# Patient Record
Sex: FEMALE | Race: WHITE | NOT HISPANIC OR LATINO
[De-identification: names, ages, dates, MRNs, and addresses within clinical notes are randomized per-mention and may not be internally consistent; named-entity substitution may affect disease eponyms.]

---

## 2017-07-13 ENCOUNTER — APPOINTMENT (OUTPATIENT)
Dept: HOME HEALTH SERVICES | Facility: HOME HEALTH | Age: 82
End: 2017-07-13

## 2017-07-13 VITALS
DIASTOLIC BLOOD PRESSURE: 70 MMHG | HEIGHT: 64 IN | WEIGHT: 135 LBS | SYSTOLIC BLOOD PRESSURE: 110 MMHG | TEMPERATURE: 97.4 F | OXYGEN SATURATION: 92 % | BODY MASS INDEX: 23.05 KG/M2 | RESPIRATION RATE: 14 BRPM | HEART RATE: 58 BPM

## 2017-07-13 DIAGNOSIS — E78.5 HYPERLIPIDEMIA, UNSPECIFIED: ICD-10-CM

## 2017-07-13 DIAGNOSIS — E55.9 VITAMIN D DEFICIENCY, UNSPECIFIED: ICD-10-CM

## 2017-07-13 DIAGNOSIS — D64.9 ANEMIA, UNSPECIFIED: ICD-10-CM

## 2017-07-13 DIAGNOSIS — H40.9 UNSPECIFIED GLAUCOMA: ICD-10-CM

## 2017-07-24 ENCOUNTER — MEDICATION RENEWAL (OUTPATIENT)
Age: 82
End: 2017-07-24

## 2017-07-27 ENCOUNTER — MEDICATION RENEWAL (OUTPATIENT)
Age: 82
End: 2017-07-27

## 2017-07-27 RX ORDER — ALPRAZOLAM 0.25 MG/1
0.25 TABLET ORAL
Refills: 0 | Status: DISCONTINUED | COMMUNITY
Start: 2017-07-13 | End: 2017-07-27

## 2017-08-28 ENCOUNTER — MEDICATION RENEWAL (OUTPATIENT)
Age: 82
End: 2017-08-28

## 2017-09-05 ENCOUNTER — MEDICATION RENEWAL (OUTPATIENT)
Age: 82
End: 2017-09-05

## 2017-09-11 ENCOUNTER — MEDICATION RENEWAL (OUTPATIENT)
Age: 82
End: 2017-09-11

## 2017-09-15 ENCOUNTER — APPOINTMENT (OUTPATIENT)
Dept: HOME HEALTH SERVICES | Facility: HOME HEALTH | Age: 82
End: 2017-09-15
Payer: MEDICARE

## 2017-09-15 VITALS
SYSTOLIC BLOOD PRESSURE: 120 MMHG | DIASTOLIC BLOOD PRESSURE: 80 MMHG | HEART RATE: 87 BPM | RESPIRATION RATE: 12 BRPM | TEMPERATURE: 97.1 F | OXYGEN SATURATION: 97 %

## 2017-09-15 DIAGNOSIS — H61.21 IMPACTED CERUMEN, RIGHT EAR: ICD-10-CM

## 2017-09-15 DIAGNOSIS — I34.1 NONRHEUMATIC MITRAL (VALVE) PROLAPSE: ICD-10-CM

## 2017-09-15 DIAGNOSIS — M54.9 DORSALGIA, UNSPECIFIED: ICD-10-CM

## 2017-09-15 PROCEDURE — 99349 HOME/RES VST EST MOD MDM 40: CPT | Mod: 25

## 2017-09-15 PROCEDURE — 69210 REMOVE IMPACTED EAR WAX UNI: CPT

## 2017-09-26 ENCOUNTER — CLINICAL ADVICE (OUTPATIENT)
Age: 82
End: 2017-09-26

## 2017-09-26 ENCOUNTER — MEDICATION RENEWAL (OUTPATIENT)
Age: 82
End: 2017-09-26

## 2017-09-26 DIAGNOSIS — Z00.00 ENCOUNTER FOR GENERAL ADULT MEDICAL EXAMINATION W/OUT ABNORMAL FINDINGS: ICD-10-CM

## 2017-10-02 ENCOUNTER — MEDICATION RENEWAL (OUTPATIENT)
Age: 82
End: 2017-10-02

## 2017-10-11 ENCOUNTER — RX RENEWAL (OUTPATIENT)
Age: 82
End: 2017-10-11

## 2017-10-20 ENCOUNTER — APPOINTMENT (OUTPATIENT)
Dept: HOME HEALTH SERVICES | Facility: HOME HEALTH | Age: 82
End: 2017-10-20

## 2017-11-06 ENCOUNTER — RX RENEWAL (OUTPATIENT)
Age: 82
End: 2017-11-06

## 2017-11-06 ENCOUNTER — MEDICATION RENEWAL (OUTPATIENT)
Age: 82
End: 2017-11-06

## 2017-11-17 ENCOUNTER — APPOINTMENT (OUTPATIENT)
Dept: HOME HEALTH SERVICES | Facility: HOME HEALTH | Age: 82
End: 2017-11-17
Payer: MEDICARE

## 2017-11-17 VITALS
RESPIRATION RATE: 12 BRPM | HEART RATE: 74 BPM | TEMPERATURE: 97.3 F | DIASTOLIC BLOOD PRESSURE: 70 MMHG | SYSTOLIC BLOOD PRESSURE: 120 MMHG | OXYGEN SATURATION: 94 %

## 2017-11-17 DIAGNOSIS — Z23 ENCOUNTER FOR IMMUNIZATION: ICD-10-CM

## 2017-11-17 PROCEDURE — 99349 HOME/RES VST EST MOD MDM 40: CPT | Mod: 25

## 2017-11-17 PROCEDURE — 90662 IIV NO PRSV INCREASED AG IM: CPT

## 2017-11-17 PROCEDURE — G0008: CPT

## 2017-11-20 ENCOUNTER — MEDICATION RENEWAL (OUTPATIENT)
Age: 82
End: 2017-11-20

## 2017-11-27 ENCOUNTER — MEDICATION RENEWAL (OUTPATIENT)
Age: 82
End: 2017-11-27

## 2017-12-04 ENCOUNTER — MEDICATION RENEWAL (OUTPATIENT)
Age: 82
End: 2017-12-04

## 2017-12-26 ENCOUNTER — MEDICATION RENEWAL (OUTPATIENT)
Age: 82
End: 2017-12-26

## 2018-01-02 ENCOUNTER — MEDICATION RENEWAL (OUTPATIENT)
Age: 83
End: 2018-01-02

## 2018-01-22 ENCOUNTER — MEDICATION RENEWAL (OUTPATIENT)
Age: 83
End: 2018-01-22

## 2018-01-26 ENCOUNTER — APPOINTMENT (OUTPATIENT)
Dept: HOME HEALTH SERVICES | Facility: HOME HEALTH | Age: 83
End: 2018-01-26
Payer: MEDICARE

## 2018-01-26 DIAGNOSIS — R15.9 FULL INCONTINENCE OF FECES: ICD-10-CM

## 2018-01-26 DIAGNOSIS — F41.9 ANXIETY DISORDER, UNSPECIFIED: ICD-10-CM

## 2018-01-26 DIAGNOSIS — F32.9 MAJOR DEPRESSIVE DISORDER, SINGLE EPISODE, UNSPECIFIED: ICD-10-CM

## 2018-01-26 DIAGNOSIS — I10 ESSENTIAL (PRIMARY) HYPERTENSION: ICD-10-CM

## 2018-01-26 DIAGNOSIS — R32 UNSPECIFIED URINARY INCONTINENCE: ICD-10-CM

## 2018-01-26 DIAGNOSIS — F02.80 ALZHEIMER'S DISEASE, UNSPECIFIED: ICD-10-CM

## 2018-01-26 DIAGNOSIS — G30.9 ALZHEIMER'S DISEASE, UNSPECIFIED: ICD-10-CM

## 2018-01-26 PROCEDURE — 99349 HOME/RES VST EST MOD MDM 40: CPT

## 2018-01-30 PROBLEM — F41.9 ANXIETY: Status: ACTIVE | Noted: 2017-07-13

## 2018-01-30 PROBLEM — R15.9 INCONTINENCE, FECES: Status: ACTIVE | Noted: 2018-01-30

## 2018-01-30 PROBLEM — F32.9 DEPRESSION: Status: ACTIVE | Noted: 2017-07-13

## 2018-01-30 PROBLEM — R32 INCONTINENCE OF URINE: Status: ACTIVE | Noted: 2018-01-30

## 2018-01-30 PROBLEM — I10 HTN (HYPERTENSION): Status: ACTIVE | Noted: 2017-07-13

## 2018-01-31 ENCOUNTER — MEDICATION RENEWAL (OUTPATIENT)
Age: 83
End: 2018-01-31

## 2018-02-10 ENCOUNTER — CLINICAL ADVICE (OUTPATIENT)
Age: 83
End: 2018-02-10

## 2018-02-13 ENCOUNTER — CLINICAL ADVICE (OUTPATIENT)
Age: 83
End: 2018-02-13

## 2018-02-13 DIAGNOSIS — R09.89 OTHER SPECIFIED SYMPTOMS AND SIGNS INVOLVING THE CIRCULATORY AND RESPIRATORY SYSTEMS: ICD-10-CM

## 2018-02-16 VITALS
RESPIRATION RATE: 18 BRPM | HEART RATE: 65 BPM | TEMPERATURE: 98.8 F | OXYGEN SATURATION: 92 % | DIASTOLIC BLOOD PRESSURE: 60 MMHG | SYSTOLIC BLOOD PRESSURE: 102 MMHG

## 2018-02-21 VITALS
SYSTOLIC BLOOD PRESSURE: 110 MMHG | RESPIRATION RATE: 16 BRPM | TEMPERATURE: 98.6 F | HEART RATE: 70 BPM | DIASTOLIC BLOOD PRESSURE: 70 MMHG | OXYGEN SATURATION: 93 %

## 2018-02-22 RX ORDER — ASPIRIN 81 MG/1
81 TABLET, CHEWABLE ORAL DAILY
Qty: 90 | Refills: 3 | Status: ACTIVE | COMMUNITY
Start: 2017-07-13

## 2018-02-22 RX ORDER — AZITHROMYCIN 500 MG/1
500 TABLET, FILM COATED ORAL DAILY
Qty: 5 | Refills: 0 | Status: ACTIVE | COMMUNITY
Start: 2017-09-26

## 2018-02-22 RX ORDER — LATANOPROST/PF 0.005 %
0.01 DROPS OPHTHALMIC (EYE)
Qty: 1 | Refills: 5 | Status: ACTIVE | COMMUNITY
Start: 2017-07-13

## 2018-02-22 RX ORDER — SERTRALINE 25 MG/1
25 TABLET, FILM COATED ORAL
Qty: 90 | Refills: 4 | Status: ACTIVE | COMMUNITY
Start: 2017-07-13

## 2018-02-22 RX ORDER — LISINOPRIL 10 MG/1
10 TABLET ORAL DAILY
Qty: 90 | Refills: 1 | Status: ACTIVE | COMMUNITY
Start: 2017-07-13

## 2018-02-22 RX ORDER — ASPIRIN 81 MG
6.5 TABLET, DELAYED RELEASE (ENTERIC COATED) ORAL
Qty: 1 | Refills: 1 | Status: ACTIVE | COMMUNITY
Start: 2017-07-13

## 2018-02-22 RX ORDER — ADHESIVE TAPE 3"X 2.3 YD
50 MCG TAPE, NON-MEDICATED TOPICAL
Qty: 30 | Refills: 3 | Status: ACTIVE | COMMUNITY
Start: 2017-07-13

## 2018-02-22 RX ORDER — GUAIFENESIN 600 MG/1
600 TABLET, EXTENDED RELEASE ORAL
Qty: 14 | Refills: 3 | Status: ACTIVE | COMMUNITY
Start: 2018-02-13

## 2018-02-28 ENCOUNTER — MEDICATION RENEWAL (OUTPATIENT)
Age: 83
End: 2018-02-28

## 2018-02-28 RX ORDER — ALPRAZOLAM 0.25 MG/1
0.25 TABLET ORAL
Qty: 120 | Refills: 0 | Status: ACTIVE | COMMUNITY
Start: 2017-07-27 | End: 1900-01-01

## 2018-02-28 RX ORDER — CHLORHEXIDINE GLUCONATE 4 %
325 (65 FE) LIQUID (ML) TOPICAL
Qty: 60 | Refills: 4 | Status: ACTIVE | COMMUNITY
Start: 2017-07-13 | End: 1900-01-01

## 2018-03-07 ENCOUNTER — CLINICAL ADVICE (OUTPATIENT)
Age: 83
End: 2018-03-07

## 2018-03-14 ENCOUNTER — APPOINTMENT (OUTPATIENT)
Dept: HOME HEALTH SERVICES | Facility: HOME HEALTH | Age: 83
End: 2018-03-14

## 2018-03-16 ENCOUNTER — MEDICATION RENEWAL (OUTPATIENT)
Age: 83
End: 2018-03-16

## 2018-03-16 ENCOUNTER — INPATIENT (INPATIENT)
Facility: HOSPITAL | Age: 83
LOS: 5 days | Discharge: HOME | DRG: 500 | End: 2018-03-22
Attending: SURGERY | Admitting: SURGERY
Payer: COMMERCIAL

## 2018-03-16 VITALS
SYSTOLIC BLOOD PRESSURE: 179 MMHG | OXYGEN SATURATION: 96 % | RESPIRATION RATE: 20 BRPM | HEART RATE: 85 BPM | DIASTOLIC BLOOD PRESSURE: 73 MMHG | TEMPERATURE: 98 F

## 2018-03-16 PROCEDURE — 72125 CT NECK SPINE W/O DYE: CPT | Mod: 26

## 2018-03-16 PROCEDURE — 70450 CT HEAD/BRAIN W/O DYE: CPT | Mod: 26

## 2018-03-16 PROCEDURE — 99285 EMERGENCY DEPT VISIT HI MDM: CPT

## 2018-03-16 RX ORDER — RISPERIDONE 0.25 MG/1
0.25 TABLET, FILM COATED ORAL
Qty: 30 | Refills: 4 | Status: ACTIVE | COMMUNITY
Start: 2017-07-13 | End: 1900-01-01

## 2018-03-16 NOTE — CONSULT NOTE ADULT - ASSESSMENT
ASSESSMENT/PLAN:    89F w/ PMH HTN, Dementia presents to ED from nursing home s/p fall after tripping over walker and hitting the L side of her face. Denies any LOC. CT Scan Brain shows "trace SAH in L Frontal Sulci" and CT C-spine shows "Acute fracture of the base of the odontoid process with 5 mm posterior subluxation (type II). No narrowing of the canal.  Fractures through bilateral lamina of C1".     -Discussed case and treatment plan with Dr. Jeromy Alan.  The CT Scan images were reviewed with Dr. Alan.   -Admit to ICU and Trauma Service for further monitoring and management  -Recommend q1 neurochecks  -HOLD anticoagulation and anti-platelet therapy  -SBP control (BP <140)  -Repeat CT Scan of HEAD in 6 hours with low threshold to repeat the imaging study sooner if there are any acute changes in the patient's neurological status (i.e. increased lethargy)  -Wear Cervical Collar at all times  -Recommend MRI of C-spine w/ and w/o contrast unless there are any contraindications   -Defer treatment and management of patient's other co-morbid medical conditions to the trauma team    -Discussed treatment recommendations above with Dr. Escalona and Dr. Dov Palacios (Trauma Service)  -s/w daughter (Yaniv) at length about patient's condition including possible treatment options, risks and complications. All of her questions were answered to her satisfaction. ASSESSMENT/PLAN:    89F w/ PMH HTN, Dementia presents to ED from nursing home s/p fall after tripping over walker and hitting the L side of her face. Denies any LOC. CT Scan Brain shows "trace SAH in L Frontal Sulci" and CT C-spine shows "Acute fracture of the base of the odontoid process with 5 mm posterior subluxation (type II). No narrowing of the canal.  Fractures through bilateral lamina of C1".     -Discussed case and treatment plan with Dr. Jeromy Alan.  The CT Scan images were reviewed with Dr. Alan.   -Admit to ICU and Trauma Service for further monitoring and management  -Recommend q1 neurochecks  -HOLD anticoagulation and anti-platelet therapy  -SBP control (BP <140)  -Repeat CT Scan of HEAD in 6 hours with low threshold to repeat the imaging study sooner if there are any acute changes in the patient's neurological status (i.e. increased lethargy)  -Wear Cervical Collar at all times  -Recommend MRI of C-spine w/ and w/o contrast (thin skull base cuts) unless there are any contraindications   -Defer treatment and management of patient's other co-morbid medical conditions to the trauma team    -Discussed treatment recommendations above with Dr. Escalona and Dr. Dov Palacios (Trauma Service)  -s/w daughter (Yaniv) at length about patient's condition including possible treatment options, risks and complications. All of her questions were answered to her satisfaction.

## 2018-03-16 NOTE — ED PROVIDER NOTE - PROGRESS NOTE DETAILS
Pt was found to have a fracture of the dens and a mild subarachnoid bleed and will be admitted to the surgical ICU. PT IS CURRENTLY ALERT AND MOVES ALL 4 EXTREMITIES

## 2018-03-16 NOTE — ED ADULT NURSE NOTE - OBJECTIVE STATEMENT
Assumed pt care @ 2345. Pt received sitting on stretcher in NAD. Pt unable to provide information. Pt in c-collar, lac to the left eyebrow with ecchymosis noted. No deformities noted. Lungs CTA, RR even unlabored.

## 2018-03-16 NOTE — ED ADULT TRIAGE NOTE - CHIEF COMPLAINT QUOTE
Pt from [facility] with witnessed fall "tripped over walker", pt with lac to left eyebrow and cheek, bleeding controlled at this time, pt with hx of dementia and at baseline, neg thinners, in no apparent distress Pt from [facility] with witnessed fall "tripped over walker", pt with lac to left eyebrow and cheek, bleeding controlled at this time, pt with hx of dementia and at baseline, neg thinners, in no apparent distress, MD Escalona notified

## 2018-03-16 NOTE — ED PROVIDER NOTE - CARE PLAN
Principal Discharge DX:	Subarachnoid hemorrhage  Secondary Diagnosis:	Dens fracture, closed, initial encounter  Secondary Diagnosis:	Closed head injury, initial encounter

## 2018-03-16 NOTE — ED ADULT NURSE NOTE - NS PRO AD PATIENT TYPE
Medical Orders for Life-Sustaining Treatment (MOLST)/DNI/Health Care Proxy (HCP)/Do Not Resuscitate (DNR)

## 2018-03-16 NOTE — ED ADULT NURSE NOTE - CHIEF COMPLAINT QUOTE
Pt from [facility] with witnessed fall "tripped over walker", pt with lac to left eyebrow and cheek, bleeding controlled at this time, pt with hx of dementia and at baseline, neg thinners, in no apparent distress, MD Escalona notified

## 2018-03-16 NOTE — CONSULT NOTE ADULT - CONSULT REASON
SAH and C2 fracture 89F w/ PMH HTN, Dementia presents to ED from nursing home s/p fall after tripping over walker and hitting the L side of her face. Denies any LOC. CT Scan Brain shows "trace SAH in L Frontal Sulci" and CT C-spine shows "Acute fracture of the base of the odontoid process with 5 mm posterior subluxation (type II). No narrowing of the canal.  Fractures through bilateral lamina of C1".      Neurosurgery was contacted by Dr. Sammi Escalona for a neurosurgical consultation at 23:17pm. The patient was seen immediately and my supervising neurosurgeon Dr. Jeromy Alan was notified.

## 2018-03-16 NOTE — ED PROVIDER NOTE - OBJECTIVE STATEMENT
89 year old female from a nursing home had a witnessed trip and fall over her walker. 89 year old female from a nursing home had a witnessed trip and fall over her walker. pt has a h/o HTN, ANXIETY, DEMENTIA NA D HYPERLIPIDEMIA

## 2018-03-17 DIAGNOSIS — R29.6 REPEATED FALLS: ICD-10-CM

## 2018-03-17 DIAGNOSIS — I60.9 NONTRAUMATIC SUBARACHNOID HEMORRHAGE, UNSPECIFIED: ICD-10-CM

## 2018-03-17 LAB
ANION GAP SERPL CALC-SCNC: 15 MMOL/L — SIGNIFICANT CHANGE UP (ref 5–17)
APTT BLD: 27.2 SEC — LOW (ref 27.5–37.4)
BASOPHILS # BLD AUTO: 0 K/UL — SIGNIFICANT CHANGE UP (ref 0–0.2)
BASOPHILS NFR BLD AUTO: 0.1 % — SIGNIFICANT CHANGE UP (ref 0–2)
BUN SERPL-MCNC: 41 MG/DL — HIGH (ref 8–20)
CALCIUM SERPL-MCNC: 9.6 MG/DL — SIGNIFICANT CHANGE UP (ref 8.6–10.2)
CHLORIDE SERPL-SCNC: 103 MMOL/L — SIGNIFICANT CHANGE UP (ref 98–107)
CO2 SERPL-SCNC: 22 MMOL/L — SIGNIFICANT CHANGE UP (ref 22–29)
CREAT SERPL-MCNC: 1.43 MG/DL — HIGH (ref 0.5–1.3)
EOSINOPHIL # BLD AUTO: 0.1 K/UL — SIGNIFICANT CHANGE UP (ref 0–0.5)
EOSINOPHIL NFR BLD AUTO: 0.7 % — SIGNIFICANT CHANGE UP (ref 0–6)
GLUCOSE SERPL-MCNC: 108 MG/DL — SIGNIFICANT CHANGE UP (ref 70–115)
HCT VFR BLD CALC: 37.1 % — SIGNIFICANT CHANGE UP (ref 37–47)
HGB BLD-MCNC: 11.7 G/DL — LOW (ref 12–16)
INR BLD: 1.06 RATIO — SIGNIFICANT CHANGE UP (ref 0.88–1.16)
LYMPHOCYTES # BLD AUTO: 1.2 K/UL — SIGNIFICANT CHANGE UP (ref 1–4.8)
LYMPHOCYTES # BLD AUTO: 12.3 % — LOW (ref 20–55)
MCHC RBC-ENTMCNC: 29.3 PG — SIGNIFICANT CHANGE UP (ref 27–31)
MCHC RBC-ENTMCNC: 31.5 G/DL — LOW (ref 32–36)
MCV RBC AUTO: 92.8 FL — SIGNIFICANT CHANGE UP (ref 81–99)
MONOCYTES # BLD AUTO: 0.6 K/UL — SIGNIFICANT CHANGE UP (ref 0–0.8)
MONOCYTES NFR BLD AUTO: 6.6 % — SIGNIFICANT CHANGE UP (ref 3–10)
NEUTROPHILS # BLD AUTO: 7.5 K/UL — SIGNIFICANT CHANGE UP (ref 1.8–8)
NEUTROPHILS NFR BLD AUTO: 80 % — HIGH (ref 37–73)
PLATELET # BLD AUTO: 197 K/UL — SIGNIFICANT CHANGE UP (ref 150–400)
POTASSIUM SERPL-MCNC: 4.8 MMOL/L — SIGNIFICANT CHANGE UP (ref 3.5–5.3)
POTASSIUM SERPL-SCNC: 4.8 MMOL/L — SIGNIFICANT CHANGE UP (ref 3.5–5.3)
PROTHROM AB SERPL-ACNC: 11.7 SEC — SIGNIFICANT CHANGE UP (ref 9.8–12.7)
RBC # BLD: 4 M/UL — LOW (ref 4.4–5.2)
RBC # FLD: 13.4 % — SIGNIFICANT CHANGE UP (ref 11–15.6)
SODIUM SERPL-SCNC: 140 MMOL/L — SIGNIFICANT CHANGE UP (ref 135–145)
WBC # BLD: 9.4 K/UL — SIGNIFICANT CHANGE UP (ref 4.8–10.8)
WBC # FLD AUTO: 9.4 K/UL — SIGNIFICANT CHANGE UP (ref 4.8–10.8)

## 2018-03-17 PROCEDURE — 70450 CT HEAD/BRAIN W/O DYE: CPT | Mod: 26

## 2018-03-17 PROCEDURE — 93010 ELECTROCARDIOGRAM REPORT: CPT

## 2018-03-17 PROCEDURE — 71045 X-RAY EXAM CHEST 1 VIEW: CPT | Mod: 26

## 2018-03-17 PROCEDURE — 99222 1ST HOSP IP/OBS MODERATE 55: CPT

## 2018-03-17 RX ORDER — FERROUS SULFATE 325(65) MG
1 TABLET ORAL
Qty: 0 | Refills: 0 | COMMUNITY

## 2018-03-17 RX ORDER — RISPERIDONE 4 MG/1
0.25 TABLET ORAL DAILY
Qty: 0 | Refills: 0 | Status: DISCONTINUED | OUTPATIENT
Start: 2018-03-17 | End: 2018-03-22

## 2018-03-17 RX ORDER — SERTRALINE 25 MG/1
25 TABLET, FILM COATED ORAL DAILY
Qty: 0 | Refills: 0 | Status: DISCONTINUED | OUTPATIENT
Start: 2018-03-17 | End: 2018-03-19

## 2018-03-17 RX ORDER — LISINOPRIL 2.5 MG/1
10 TABLET ORAL DAILY
Qty: 0 | Refills: 0 | Status: DISCONTINUED | OUTPATIENT
Start: 2018-03-17 | End: 2018-03-22

## 2018-03-17 RX ORDER — SODIUM CHLORIDE 9 MG/ML
1000 INJECTION INTRAMUSCULAR; INTRAVENOUS; SUBCUTANEOUS
Qty: 0 | Refills: 0 | Status: DISCONTINUED | OUTPATIENT
Start: 2018-03-17 | End: 2018-03-17

## 2018-03-17 RX ORDER — LATANOPROST 0.05 MG/ML
1 SOLUTION/ DROPS OPHTHALMIC; TOPICAL AT BEDTIME
Qty: 0 | Refills: 0 | Status: DISCONTINUED | OUTPATIENT
Start: 2018-03-17 | End: 2018-03-22

## 2018-03-17 RX ORDER — FERROUS SULFATE 325(65) MG
325 TABLET ORAL DAILY
Qty: 0 | Refills: 0 | Status: DISCONTINUED | OUTPATIENT
Start: 2018-03-17 | End: 2018-03-22

## 2018-03-17 RX ORDER — CHOLECALCIFEROL (VITAMIN D3) 125 MCG
1000 CAPSULE ORAL DAILY
Qty: 0 | Refills: 0 | Status: DISCONTINUED | OUTPATIENT
Start: 2018-03-17 | End: 2018-03-22

## 2018-03-17 RX ADMIN — Medication 1000 UNIT(S): at 11:40

## 2018-03-17 RX ADMIN — Medication 325 MILLIGRAM(S): at 11:40

## 2018-03-17 RX ADMIN — LISINOPRIL 10 MILLIGRAM(S): 2.5 TABLET ORAL at 06:46

## 2018-03-17 RX ADMIN — RISPERIDONE 0.25 MILLIGRAM(S): 4 TABLET ORAL at 11:40

## 2018-03-17 RX ADMIN — SERTRALINE 25 MILLIGRAM(S): 25 TABLET, FILM COATED ORAL at 11:40

## 2018-03-17 RX ADMIN — LATANOPROST 1 DROP(S): 0.05 SOLUTION/ DROPS OPHTHALMIC; TOPICAL at 23:26

## 2018-03-17 NOTE — ED ADULT NURSE REASSESSMENT NOTE - NS ED NURSE REASSESS COMMENT FT1
Bedside report given to CHANTAL Wang. patient awake and NSR on cardiac monitor, resps even and unlabored, in no apparent distress.  Plan, abnormal labs, history of present illness, pending labs/tests explained, opportunity to answer questions provided.

## 2018-03-17 NOTE — SWALLOW BEDSIDE ASSESSMENT ADULT - SLP GENERAL OBSERVATIONS
Pt received A&A in bed, reduced cognition, per family, understands both English and Kazakh, tends to be speaking more in Swedish with progression of dementia, poor command following, OK to position pt upright in bed as per PA Adithya,

## 2018-03-17 NOTE — PROGRESS NOTE ADULT - SUBJECTIVE AND OBJECTIVE BOX
INTERVAL HPI/OVERNIGHT EVENTS:  89y Female PMH Alzheimer's dementia, HTN, s/p fall found with L frontal SAH and type 2 odontoid fracture. Patient seen earlier this AM, c-collar in place. Minimally cooperative with exam but moving all extremities symmetrically. Intermittently following some simple commands in English, when comprehensible, speaking English asking to take off her mittens. Per RN, patient sometimes speaks/responds in English, other times Filipino (baseline; also baseline oriented to self only).     Repeat CT head 3/17/18 stable    Vital Signs Last 24 Hrs  T(C): 36.7 (17 Mar 2018 08:00), Max: 36.8 (17 Mar 2018 01:44)  T(F): 98.1 (17 Mar 2018 08:00), Max: 98.3 (17 Mar 2018 01:44)  HR: 91 (17 Mar 2018 08:00) (82 - 95)  BP: 146/82 (17 Mar 2018 08:00) (125/70 - 179/73)  BP(mean): 108 (17 Mar 2018 08:00) (94 - 113)  RR: 20 (17 Mar 2018 08:00) (17 - 47)  SpO2: 98% (17 Mar 2018 08:00) (94% - 99%)    PHYSICAL EXAM:  GENERAL: NAD, thin  HEAD: +traumatic- L periorbital ecchymosis/edema  NECK: C-collar in place  MENTAL STATUS: Oriented to self only; Awake; Opens eyes spontaneously; Difficult to understand speech, minimally cooperative with examination; following some simple commands  CRANIAL NERVES: R pupil disfigured likely s/p cataract sx; L pupil unable to assess as patient unable to fully open eye d/t periorbital edema; no facial asymmetry; facial sensation grossly intact to light touch b/l  MOTOR: moving b/l upper extremities to command against gravity, symmetrically. Moves b/l lowers to command  SENSATION: grossly intact to light touch all extremities    LABS:                        11.7   9.4   )-----------( 197      ( 17 Mar 2018 00:04 )             37.1     03-17    140  |  103  |  41.0<H>  ----------------------------<  108  4.8   |  22.0  |  1.43<H>    Ca    9.6      17 Mar 2018 00:04      PT/INR - ( 17 Mar 2018 00:04 )   PT: 11.7 sec;   INR: 1.06 ratio         PTT - ( 17 Mar 2018 00:04 )  PTT:27.2 sec      03-16 @ 07:01  -  03-17 @ 07:00  --------------------------------------------------------  IN: 250 mL / OUT: 0 mL / NET: 250 mL    03-17 @ 07:01  -  03-17 @ 08:56  --------------------------------------------------------  IN: 50 mL / OUT: 0 mL / NET: 50 mL    RADIOLOGY & ADDITIONAL TESTS:  - from: CT Head No Cont (03.17.18 @ 05:05)  IMPRESSION:  Stable small volume subarachnoid hemorrhage    - from: CT Cervical Spine No Cont (03.16.18 @ 22:08)  IMPRESSION:  Acute fracture of the base of the odontoid process with 5 mm posterior   subluxation (type II). No narrowing of the canal.  Fractures through bilateral lamina of C1.    - from: CT Orbit No Cont (03.16.18 @ 22:08)  IMPRESSION:  No orbital fracture  Left periorbital soft tissue swelling    - from: CT Head No Cont (03.16.18 @ 22:08)  IMPRESSION:   Trace subarachnoid hemorrhage, posttraumatic. Follow-up recommended

## 2018-03-17 NOTE — SWALLOW BEDSIDE ASSESSMENT ADULT - ASR SWALLOW ASPIRATION MONITOR
oral hygiene/pneumonia/position upright (89Y)/throat clearing/fever/change of breathing pattern/cough/gurgly voice

## 2018-03-17 NOTE — SWALLOW BEDSIDE ASSESSMENT ADULT - ORAL PREPARATORY PHASE
reduced attention to bolus requiring cues to elicit swallow; pt benefited from cues to minimize verbalizing with PO in mouth Decreased mastication ability/reduced attention to bolus with +oral holding ~1 minute Within functional limits

## 2018-03-17 NOTE — PROGRESS NOTE ADULT - ASSESSMENT
A/P: 89y Female PMH Alzheimer's dementia, HTN, s/p fall found with L frontal SAH and type 2 odontoid fracture.  Repeat CT head stable  - Will d/w attending  - As per night staff discussion w/ Dr. Alan, recommend MRI C spine to assess ligaments  - C-collar at all times- orthotist notified, to bring more fitted brace today  - Pain control PRN  - Given acute hemorrhage, ideally from neurosurgical perspective recommend continuing observation w/ serial neuro checks in the ICU setting x at least 24 hours, any change in mental status should warrant a stat repeat CT head   - In addition ideally would recommend holding ASA 81 x 1 week, other antiplatelet therapy including  or therapeutic anticoagulation x 2 weeks  - Ok from our perspective to start lovenox for DVT ppx 3/18/18  - Supportive care/further medical management per primary team

## 2018-03-17 NOTE — SWALLOW BEDSIDE ASSESSMENT ADULT - SLP PERTINENT HISTORY OF CURRENT PROBLEM
Pt with h/o Alzheimers dementia, admitted s/p fall. CT head/spine--> Type II dens fracture and b/l C1 laminar fracture as well as trace SAH

## 2018-03-17 NOTE — H&P ADULT - NSHPPHYSICALEXAM_GEN_ALL_CORE
HEENT: Normocephalic, 5cm laceration to left supraorbital area, NORA, EOM wnl, no otorrhea or hemotympanum b/l, no epistaxis or d/c b/l nares, no craniofacial bony pathology or tenderness b/l  Neck: Pt in hard cervical collar at time of exam. No crepitus, no ecchymosis, no hematoma, to exam, no JVD, no tracheal deviation  Cardiovascular: S1S2 Present  Chest: no gross rib pathology or tenderness to exam. No sternal pathology or tenderness to exam. No crepitus, no ecchymosis, no hematoma. No penetrating thorcoabdominal trauma  Respiratory: Respiratory Effort normal; no wheezes, rales or rhonchi to exam  ABD: bowel sounds (+), soft, nontender, non distended, no rebound, no guarding, no rigidity, no skin changes to exam. No pelvic instability to exam, no skin changes  Musculoskeletal: Pt has palpable b/l radial, femoral, dorsalis pedis pulses. All digits are warm and well perfused. No gross long bone pathology or tenderness to exam. Pt demonstrates grossly intact sensoromotor function. Pt has good capillary refill to digits, no calf edema or tenderness to exam.  Skin: no lesions or rashes to exam

## 2018-03-17 NOTE — H&P ADULT - ASSESSMENT
89yo female s/p fall presents with Type II Dens fracture, bilateral C1 laminar fracture, and small SAH    -Admit to SICU under Trauma Service  -NeuroSx consulted. Recommended f/u CT Head and MRI Cspine  -Laceration repaired to Left Supraorbital  -Hold all AC and antiplatelets

## 2018-03-17 NOTE — SWALLOW BEDSIDE ASSESSMENT ADULT - SWALLOW EVAL: RECOMMENDED FEEDING/EATING TECHNIQUES
position upright (90 degrees)/ensure pt has swallowed prior to next administered bite/small sips/bites/crush medication (when feasible)/oral hygiene

## 2018-03-17 NOTE — SWALLOW BEDSIDE ASSESSMENT ADULT - ORAL PHASE
likely 2* reduced cognition/Delayed oral transit time Delayed oral transit time/Decreased anterior-posterior movement of the bolus Within functional limits

## 2018-03-17 NOTE — H&P ADULT - PMH
Alzheimer's dementia with behavioral disturbance, unspecified timing of dementia onset    Anemia, unspecified type    Anxiety    Dementia    Hyperlipidemia, unspecified hyperlipidemia type    Hypertension, unspecified type

## 2018-03-17 NOTE — H&P ADULT - HISTORY OF PRESENT ILLNESS
89yo female with history of Alzheimer's and dementia presents to hospital after fall. Per patient chart, she is a resident at [facility] where while walking with walker she tripped and fell. Fall was witnessed. No LOC reported. She sustained a laceration to lateral aspect of left supraorbital. In ED, a CT Cspine and Head were evident for Type II Dens fracture and bilateral C1 laminar fracture as as well as a trace subarachnoid hemorhage. Trauma Surgery was consulted for found fractures.     A-Airway Intact  B-Breath Sounds [place] Bilaterally  C-Central and Peripheral Pulses bilaterally  D-GCS 13  E-No deformities

## 2018-03-17 NOTE — H&P ADULT - ATTENDING COMMENTS
Seen and examined.  Agree w/ above H+P.  89 yo demented patient living in facility, fell with walker.  Sustained trace SAH and Type II dens fracture w/ extension to b/l C1 lamina.  Poor historian.  In no distress.  Neurosurgery has seen patient and wish for MRI however patient agitated and pulling at things, requires 1:1; doubt she will lay still for MRI.  Would need sedation and intubation for MRI - need discussion with family about goals of care and how they wish to proceed.  Will admit to SICU for serial neuro exams.  C-collar to remain in place.  Repeat head CT 6hrs after original.

## 2018-03-17 NOTE — SWALLOW BEDSIDE ASSESSMENT ADULT - SWALLOW EVAL: DIAGNOSIS
Oral stage - impacted by presence of c-collar and reduced cognition, however functional for puree. Pharyngeal stage appears functional with no overt s/s aspiration for administered consistencies. Pt's cognitive status, positioning difficulty 2* c-collar  places her at risk for aspiration.

## 2018-03-18 RX ORDER — ALPRAZOLAM 0.25 MG
0.25 TABLET ORAL
Qty: 0 | Refills: 0 | Status: DISCONTINUED | OUTPATIENT
Start: 2018-03-18 | End: 2018-03-19

## 2018-03-18 RX ORDER — ENOXAPARIN SODIUM 100 MG/ML
30 INJECTION SUBCUTANEOUS DAILY
Qty: 0 | Refills: 0 | Status: DISCONTINUED | OUTPATIENT
Start: 2018-03-18 | End: 2018-03-22

## 2018-03-18 RX ADMIN — RISPERIDONE 0.25 MILLIGRAM(S): 4 TABLET ORAL at 14:38

## 2018-03-18 RX ADMIN — Medication 0.25 MILLIGRAM(S): at 17:59

## 2018-03-18 RX ADMIN — ENOXAPARIN SODIUM 30 MILLIGRAM(S): 100 INJECTION SUBCUTANEOUS at 17:59

## 2018-03-18 RX ADMIN — LATANOPROST 1 DROP(S): 0.05 SOLUTION/ DROPS OPHTHALMIC; TOPICAL at 22:32

## 2018-03-18 RX ADMIN — Medication 0.25 MILLIGRAM(S): at 22:32

## 2018-03-18 RX ADMIN — LISINOPRIL 10 MILLIGRAM(S): 2.5 TABLET ORAL at 06:16

## 2018-03-18 RX ADMIN — Medication 325 MILLIGRAM(S): at 14:39

## 2018-03-18 RX ADMIN — Medication 1000 UNIT(S): at 14:37

## 2018-03-18 RX ADMIN — SERTRALINE 25 MILLIGRAM(S): 25 TABLET, FILM COATED ORAL at 14:38

## 2018-03-18 NOTE — PROGRESS NOTE ADULT - ASSESSMENT
89yo female s/p fall presents with left frontal SAH, bilateral C1 lamina fx and type 2 dens fx  -Hemodynamically stable      Plan:  -C collar at all times  -will start lovenox today   -ASA to held for one week  -Palliative care was consulted  -f/u with PT  -continue with home medications

## 2018-03-18 NOTE — PROGRESS NOTE ADULT - SUBJECTIVE AND OBJECTIVE BOX
INTERVAL HPI/OVERNIGHT EVENTS:  Patient was seen and examined at bedside this AM. No acute events overnight. Transfered from SICU overnight.  Pt is resting in bed and responds to commands.    STATUS POST:      POST OPERATIVE DAY #:       MEDICATIONS  (STANDING):  cholecalciferol 1000 Unit(s) Oral daily  ferrous    sulfate 325 milliGRAM(s) Oral daily  latanoprost 0.005% Ophthalmic Solution 1 Drop(s) Both EYES at bedtime  lisinopril 10 milliGRAM(s) Oral daily  risperiDONE   Tablet 0.25 milliGRAM(s) Oral daily  sertraline 25 milliGRAM(s) Oral daily    MEDICATIONS  (PRN):      Vital Signs Last 24 Hrs  T(C): 36.4 (18 Mar 2018 08:25), Max: 36.9 (17 Mar 2018 19:40)  T(F): 97.6 (18 Mar 2018 08:25), Max: 98.4 (17 Mar 2018 19:40)  HR: 86 (18 Mar 2018 08:25) (78 - 103)  BP: 133/71 (18 Mar 2018 08:25) (124/74 - 165/67)  BP(mean): 103 (17 Mar 2018 19:00) (93 - 109)  RR: 18 (18 Mar 2018 08:25) (18 - 26)  SpO2: 99% (18 Mar 2018 08:25) (97% - 100%)    Physical Exam:  Gen: NAD  Neurological:  responds to commands  HEENT: PERRLA, EOMI. laceration to left supaorbital; no surrounding erythema or edema. Wearing [place] j collar  Respiratory: Breath Sounds equal & CTA bilaterally, no accessory muscle use  Cardiovascular: Regular rate & rhythm, normal S1, S2; no murmurs, gallops or rubs  Gastrointestinal: Soft, non-tender, nondistended  Vascular: Equal and normal pulses: 2+ peripheral pulses throughout  Skin: No rashes      I&O's Detail    17 Mar 2018 07:01  -  18 Mar 2018 07:00  --------------------------------------------------------  IN:    sodium chloride 0.9%: 200 mL  Total IN: 200 mL    OUT:  Total OUT: 0 mL    Total NET: 200 mL          LABS:                        11.7   9.4   )-----------( 197      ( 17 Mar 2018 00:04 )             37.1     03-17    140  |  103  |  41.0<H>  ----------------------------<  108  4.8   |  22.0  |  1.43<H>    Ca    9.6      17 Mar 2018 00:04      PT/INR - ( 17 Mar 2018 00:04 )   PT: 11.7 sec;   INR: 1.06 ratio         PTT - ( 17 Mar 2018 00:04 )  PTT:27.2 sec      RADIOLOGY & ADDITIONAL STUDIES:

## 2018-03-18 NOTE — PHYSICAL THERAPY INITIAL EVALUATION ADULT - ACTIVE RANGE OF MOTION EXAMINATION, REHAB EVAL
Bilateral UE shd flexion approx 45 degrees , bilateral knee flexion approx 45 degrees, pt would not cooperate and bend further/deficits as listed below

## 2018-03-18 NOTE — PROGRESS NOTE ADULT - SUBJECTIVE AND OBJECTIVE BOX
INTERVAL EVENTS:  No events overnight. Stable, transferred out from SICU.  Repeat CT head 3/17/18 stable    Vital Signs Last 24 Hrs  T(C): 36.4 (18 Mar 2018 08:25), Max: 36.9 (17 Mar 2018 19:40)  T(F): 97.6 (18 Mar 2018 08:25), Max: 98.4 (17 Mar 2018 19:40)  HR: 86 (18 Mar 2018 08:25) (78 - 103)  BP: 133/71 (18 Mar 2018 08:25) (124/74 - 158/86)  BP(mean): 103 (17 Mar 2018 19:00) (93 - 109)  RR: 18 (18 Mar 2018 08:25) (18 - 23)      RADIOLOGY & ADDITIONAL TESTS:  CT Head No Cont (03.17.18 @ 05:05)  IMPRESSION:  Stable small volume subarachnoid hemorrhage    - from: CT Head No Cont (03.17.18 @ 05:05)  IMPRESSION:  Stable small volume subarachnoid hemorrhage    - from: CT Cervical Spine No Cont (03.16.18 @ 22:08)  IMPRESSION:  Acute fracture of the base of the odontoid process with 5 mm posterior   subluxation (type II). No narrowing of the canal.  Fractures through bilateral lamina of C1.    - from: CT Orbit No Cont (03.16.18 @ 22:08)  IMPRESSION:  No orbital fracture  Left periorbital soft tissue swelling    - from: CT Head No Cont (03.16.18 @ 22:08)  IMPRESSION:   Trace subarachnoid hemorrhage, posttraumatic. Follow-up recommended

## 2018-03-18 NOTE — PROGRESS NOTE ADULT - ASSESSMENT
89y Female PMH Alzheimer's dementia, HTN, s/p fall found with L frontal SAH and type 2 odontoid fracture.  Repeat CT head stable    PLAN:  - No plans for MRI per ACS  - C-collar at all times  - ideally would recommend holding ASA 81 x 1 week, other antiplatelet therapy including  or therapeutic anticoagulation x 2 weeks  - Ok from our perspective OK for lovenox for DVT ppx  - Supportive care/further medical management per primary team    No further inpatient recommendations. Follow up with Dr. Alan 1-2 weeks post discharge.

## 2018-03-18 NOTE — PHYSICAL THERAPY INITIAL EVALUATION ADULT - ADDITIONAL COMMENTS
Pt lives in a SNF    Pt minimally cooperative during assessment  Pt speaks english and German at times  pt not following commands or directions

## 2018-03-19 LAB
ANION GAP SERPL CALC-SCNC: 16 MMOL/L — SIGNIFICANT CHANGE UP (ref 5–17)
BUN SERPL-MCNC: 31 MG/DL — HIGH (ref 8–20)
CALCIUM SERPL-MCNC: 9.9 MG/DL — SIGNIFICANT CHANGE UP (ref 8.6–10.2)
CHLORIDE SERPL-SCNC: 106 MMOL/L — SIGNIFICANT CHANGE UP (ref 98–107)
CO2 SERPL-SCNC: 22 MMOL/L — SIGNIFICANT CHANGE UP (ref 22–29)
CREAT SERPL-MCNC: 0.93 MG/DL — SIGNIFICANT CHANGE UP (ref 0.5–1.3)
EOSINOPHIL # BLD AUTO: 0 K/UL — SIGNIFICANT CHANGE UP (ref 0–0.5)
EOSINOPHIL NFR BLD AUTO: 0.5 % — SIGNIFICANT CHANGE UP (ref 0–6)
GLUCOSE SERPL-MCNC: 92 MG/DL — SIGNIFICANT CHANGE UP (ref 70–115)
HCT VFR BLD CALC: 35.5 % — LOW (ref 37–47)
HGB BLD-MCNC: 11.4 G/DL — LOW (ref 12–16)
LYMPHOCYTES # BLD AUTO: 0.9 K/UL — LOW (ref 1–4.8)
LYMPHOCYTES # BLD AUTO: 11.7 % — LOW (ref 20–55)
MAGNESIUM SERPL-MCNC: 2 MG/DL — SIGNIFICANT CHANGE UP (ref 1.6–2.6)
MCHC RBC-ENTMCNC: 29.6 PG — SIGNIFICANT CHANGE UP (ref 27–31)
MCHC RBC-ENTMCNC: 32.1 G/DL — SIGNIFICANT CHANGE UP (ref 32–36)
MCV RBC AUTO: 92.2 FL — SIGNIFICANT CHANGE UP (ref 81–99)
MONOCYTES # BLD AUTO: 0.5 K/UL — SIGNIFICANT CHANGE UP (ref 0–0.8)
MONOCYTES NFR BLD AUTO: 6.7 % — SIGNIFICANT CHANGE UP (ref 3–10)
NEUTROPHILS # BLD AUTO: 6 K/UL — SIGNIFICANT CHANGE UP (ref 1.8–8)
NEUTROPHILS NFR BLD AUTO: 80.7 % — HIGH (ref 37–73)
PHOSPHATE SERPL-MCNC: 3.5 MG/DL — SIGNIFICANT CHANGE UP (ref 2.4–4.7)
PLATELET # BLD AUTO: 190 K/UL — SIGNIFICANT CHANGE UP (ref 150–400)
POTASSIUM SERPL-MCNC: 4.4 MMOL/L — SIGNIFICANT CHANGE UP (ref 3.5–5.3)
POTASSIUM SERPL-SCNC: 4.4 MMOL/L — SIGNIFICANT CHANGE UP (ref 3.5–5.3)
RBC # BLD: 3.85 M/UL — LOW (ref 4.4–5.2)
RBC # FLD: 13.6 % — SIGNIFICANT CHANGE UP (ref 11–15.6)
SODIUM SERPL-SCNC: 144 MMOL/L — SIGNIFICANT CHANGE UP (ref 135–145)
WBC # BLD: 7.4 K/UL — SIGNIFICANT CHANGE UP (ref 4.8–10.8)
WBC # FLD AUTO: 7.4 K/UL — SIGNIFICANT CHANGE UP (ref 4.8–10.8)

## 2018-03-19 PROCEDURE — 99223 1ST HOSP IP/OBS HIGH 75: CPT

## 2018-03-19 PROCEDURE — 99222 1ST HOSP IP/OBS MODERATE 55: CPT

## 2018-03-19 RX ORDER — ACETAMINOPHEN 500 MG
650 TABLET ORAL EVERY 8 HOURS
Qty: 0 | Refills: 0 | Status: DISCONTINUED | OUTPATIENT
Start: 2018-03-19 | End: 2018-03-22

## 2018-03-19 RX ORDER — SERTRALINE 25 MG/1
50 TABLET, FILM COATED ORAL DAILY
Qty: 0 | Refills: 0 | Status: DISCONTINUED | OUTPATIENT
Start: 2018-03-19 | End: 2018-03-22

## 2018-03-19 RX ORDER — TRAMADOL HYDROCHLORIDE 50 MG/1
25 TABLET ORAL EVERY 4 HOURS
Qty: 0 | Refills: 0 | Status: DISCONTINUED | OUTPATIENT
Start: 2018-03-19 | End: 2018-03-22

## 2018-03-19 RX ORDER — FENTANYL CITRATE 50 UG/ML
1 INJECTION INTRAVENOUS
Qty: 0 | Refills: 0 | Status: DISCONTINUED | OUTPATIENT
Start: 2018-03-19 | End: 2018-03-22

## 2018-03-19 RX ORDER — SERTRALINE 25 MG/1
25 TABLET, FILM COATED ORAL DAILY
Qty: 0 | Refills: 0 | Status: DISCONTINUED | OUTPATIENT
Start: 2018-03-19 | End: 2018-03-22

## 2018-03-19 RX ORDER — ACETAMINOPHEN 500 MG
1000 TABLET ORAL ONCE
Qty: 0 | Refills: 0 | Status: COMPLETED | OUTPATIENT
Start: 2018-03-19 | End: 2018-03-19

## 2018-03-19 RX ORDER — POLYETHYLENE GLYCOL 3350 17 G/17G
17 POWDER, FOR SOLUTION ORAL AT BEDTIME
Qty: 0 | Refills: 0 | Status: DISCONTINUED | OUTPATIENT
Start: 2018-03-19 | End: 2018-03-22

## 2018-03-19 RX ORDER — GABAPENTIN 400 MG/1
100 CAPSULE ORAL AT BEDTIME
Qty: 0 | Refills: 0 | Status: DISCONTINUED | OUTPATIENT
Start: 2018-03-19 | End: 2018-03-22

## 2018-03-19 RX ORDER — ALPRAZOLAM 0.25 MG
0.25 TABLET ORAL
Qty: 0 | Refills: 0 | Status: DISCONTINUED | OUTPATIENT
Start: 2018-03-19 | End: 2018-03-19

## 2018-03-19 RX ORDER — ALPRAZOLAM 0.25 MG
0.25 TABLET ORAL EVERY 6 HOURS
Qty: 0 | Refills: 0 | Status: DISCONTINUED | OUTPATIENT
Start: 2018-03-19 | End: 2018-03-22

## 2018-03-19 RX ADMIN — Medication 325 MILLIGRAM(S): at 13:32

## 2018-03-19 RX ADMIN — ENOXAPARIN SODIUM 30 MILLIGRAM(S): 100 INJECTION SUBCUTANEOUS at 13:32

## 2018-03-19 RX ADMIN — SERTRALINE 50 MILLIGRAM(S): 25 TABLET, FILM COATED ORAL at 17:11

## 2018-03-19 RX ADMIN — Medication 400 MILLIGRAM(S): at 17:11

## 2018-03-19 RX ADMIN — Medication 1000 MILLIGRAM(S): at 18:11

## 2018-03-19 RX ADMIN — FENTANYL CITRATE 1 PATCH: 50 INJECTION INTRAVENOUS at 17:11

## 2018-03-19 RX ADMIN — Medication 0.25 MILLIGRAM(S): at 21:37

## 2018-03-19 RX ADMIN — RISPERIDONE 0.25 MILLIGRAM(S): 4 TABLET ORAL at 13:32

## 2018-03-19 RX ADMIN — Medication 1000 UNIT(S): at 13:32

## 2018-03-19 RX ADMIN — Medication 0.25 MILLIGRAM(S): at 17:11

## 2018-03-19 RX ADMIN — LATANOPROST 1 DROP(S): 0.05 SOLUTION/ DROPS OPHTHALMIC; TOPICAL at 21:37

## 2018-03-19 RX ADMIN — POLYETHYLENE GLYCOL 3350 17 GRAM(S): 17 POWDER, FOR SOLUTION ORAL at 21:37

## 2018-03-19 RX ADMIN — GABAPENTIN 100 MILLIGRAM(S): 400 CAPSULE ORAL at 21:37

## 2018-03-19 RX ADMIN — SERTRALINE 25 MILLIGRAM(S): 25 TABLET, FILM COATED ORAL at 13:32

## 2018-03-19 NOTE — CONSULT NOTE ADULT - SUBJECTIVE AND OBJECTIVE BOX
HPI:This is a 89yo H frail elderly female, admitted after falling in SNF. PMH of Alzheimers Dementia, HTN, Anemia and Anxiety.   CC: Anxiety, related to aches pains, new environment. Wearing Mitts and bed positioned jacknife, refusing oral medications.   Would not eat her breakfast, (dysphagia 1 diet pure with thin liquids) even when RN spent time at bedside trying to feed. Left eye and forehead swollen and ecchymotic    89yo female with history of Alzheimer's and dementia presents to hospital after fall. Per patient chart, she is a resident at [facility] where while walking with walker she tripped and fell. Fall was witnessed. No LOC reported. She sustained a laceration to lateral aspect of left supraorbital. In ED, a CT Cspine and Head were evident for Type II Dens fracture and bilateral C1 laminar fracture as as well as a trace subarachnoid hemorrhage. Trauma Surgery was consulted for found fractures.     A-Airway Intact  B-Breath Sounds [place] Bilaterally  C-Central and Peripheral Pulses bilaterally  D-GCS 13  E-No deformities (17 Mar 2018 01:13)      PERTINENT PMH REVIEWED: Yes     PAST MEDICAL & SURGICAL HISTORY:  Dementia  Alzheimer's dementia with behavioral disturbance, unspecified timing of dementia onset  Anemia, unspecified type  Hyperlipidemia, unspecified hyperlipidemia type  Anxiety  Hypertension, unspecified type      SOCIAL HISTORY:  EtOH    No                                    Drugs    No                               nonsmoker                                    Admitted from: home  SNF __[facility]_____Daughter and HCP: Soni Pete  [phone]    No Known Allergies    Baseline ADLs (prior to admission):   Dependent      Present Symptoms:     Dyspnea: 0   Nausea/Vomiting:  No  Anxiety:  Yes   Depression:NO  Fatigue: Yes   Loss of appetite: Yes refusing to eat    Pain:             Character-            Duration-            Effect-            Factors-            Frequency-            Location-Head, Neck fracture            Severity-moderate  Patient poor self advocate    Review of Systems: Reviewed                   Unable to obtain due to poor mentation       MEDICATIONS  (STANDING):  acetaminophen    Suspension. 650 milliGRAM(s) Oral every 8 hours  acetaminophen  IVPB. 1000 milliGRAM(s) IV Intermittent once  ALPRAZolam 0.25 milliGRAM(s) Oral two times a day  cholecalciferol 1000 Unit(s) Oral daily  enoxaparin Injectable 30 milliGRAM(s) SubCutaneous daily  ferrous    sulfate 325 milliGRAM(s) Oral daily  latanoprost 0.005% Ophthalmic Solution 1 Drop(s) Both EYES at bedtime  lisinopril 10 milliGRAM(s) Oral daily  polyethylene glycol 3350 17 Gram(s) Oral at bedtime  risperiDONE   Tablet 0.25 milliGRAM(s) Oral daily  sertraline 50 milliGRAM(s) Oral daily  sertraline 25 milliGRAM(s) Oral daily    MEDICATIONS  (PRN):  bisacodyl Suppository 10 milliGRAM(s) Rectal daily PRN Constipation  traMADol 25 milliGRAM(s) Oral every 4 hours PRN Severe Pain (7 - 10)      PHYSICAL EXAM:    Vital Signs Last 24 Hrs  T(C): 36.7 (19 Mar 2018 07:57), Max: 37.2 (18 Mar 2018 23:11)  T(F): 98 (19 Mar 2018 07:57), Max: 98.9 (18 Mar 2018 23:11)  HR: 90 (19 Mar 2018 07:57) (75 - 90)  BP: 145/103 (19 Mar 2018 07:57) (136/78 - 148/86)  BP(mean): --  RR: 18 (19 Mar 2018 07:57) (18 - 18)  SpO2: 97% (19 Mar 2018 07:57) (91% - 97%)    General: alert   ____ restless                   cachexia  speech in-intelligible    HEENT: normal      Lungs: comfortable    CV: normal      GI: soft distended                constipation  last BM:     :  incontinent      MSK: weakness              ambulatory  bedbound    Skin: normal  _  no rash    LABS:                        11.4   7.4   )-----------( 190      ( 19 Mar 2018 07:43 )             35.5     03-19    144  |  106  |  31.0<H>  ----------------------------<  92  4.4   |  22.0  |  0.93    Ca    9.9      19 Mar 2018 07:43  Phos  3.5     03-19  Mg     2.0     03-19    I&O's Summary    RADIOLOGY & ADDITIONAL STUDIES:  < from: CT Head No Cont (03.17.18 @ 05:05) >  FINDINGS:    Stable small volume subarachnoid hemorrhage along the left frontal sulci.     There is volume loss and atherosclerosis. White matter hypodensities   noted compatible with mild to moderate chronic microvascular ischemic   change in this age group. There is no midline shift, mass effect, or   ventriculomegaly.    The calvarium is intact. The visualized paranasal sinuses are aerated.   The mastoid air cells are clear. Left periorbital soft tissue swelling    IMPRESSION:    Stable small volume subarachnoid hemorrhage    ADVANCE DIRECTIVES:   DNR YES  Completed on:                     MOLST  YES   Completed on:  Living Will   NO   Completed on:      COUNSELING:    Face to face meeting to discuss Advanced Care Planning - Time Spent ______ Minutes.  See goals of care note.    More than 50% time spent in counseling and coordinating care. __35____ Minutes.     Thank you for the opportunity to assist with the care of this patient.   [facility] [phone].

## 2018-03-19 NOTE — PROGRESS NOTE ADULT - ASSESSMENT
89y Female PMH Alzheimer's dementia, HTN, s/p fall found with L frontal SAH and type 2 odontoid fracture  -hemodynamically stable  -no mri scan.  -NeuoSx reccs holding aspirin x1 week.     Plan:  -C collar at all times. Will f.u up with NeuroSx when collar can be remived  -Palliative care was consulted  -f/u with PT  -continue with home medications

## 2018-03-19 NOTE — OCCUPATIONAL THERAPY INITIAL EVALUATION ADULT - MANUAL MUSCLE TESTING RESULTS, REHAB EVAL
pt with partial ROM due to cervical precautions/pt unable to follow commands due to lethargy/not tested due to

## 2018-03-19 NOTE — PROGRESS NOTE ADULT - SUBJECTIVE AND OBJECTIVE BOX
INTERVAL HPI/OVERNIGHT EVENTS:  Patient was seen and examined at bedside this AM.  No acute events overnight.     STATUS POST:      POST OPERATIVE DAY #:       MEDICATIONS  (STANDING):  ALPRAZolam 0.25 milliGRAM(s) Oral two times a day  cholecalciferol 1000 Unit(s) Oral daily  enoxaparin Injectable 30 milliGRAM(s) SubCutaneous daily  ferrous    sulfate 325 milliGRAM(s) Oral daily  latanoprost 0.005% Ophthalmic Solution 1 Drop(s) Both EYES at bedtime  lisinopril 10 milliGRAM(s) Oral daily  risperiDONE   Tablet 0.25 milliGRAM(s) Oral daily  sertraline 25 milliGRAM(s) Oral daily    MEDICATIONS  (PRN):      Vital Signs Last 24 Hrs  T(C): 36.7 (19 Mar 2018 07:57), Max: 37.2 (18 Mar 2018 23:11)  T(F): 98 (19 Mar 2018 07:57), Max: 98.9 (18 Mar 2018 23:11)  HR: 90 (19 Mar 2018 07:57) (75 - 90)  BP: 145/103 (19 Mar 2018 07:57) (136/78 - 148/86)  BP(mean): --  RR: 18 (19 Mar 2018 07:57) (18 - 18)  SpO2: 97% (19 Mar 2018 07:57) (91% - 97%)    Physical Exam:  Gen: NAD  Neurological:  No sensory/motor deficits  HEENT: in [facility]. laceration above left supraorbital healing well  Respiratory: Breath Sounds equal & CTA bilaterally, no accessory muscle use  Cardiovascular: Regular rate & rhythm, normal S1, S2; no murmurs, gallops or rubs  Gastrointestinal: Soft, non-tender, nondistended  Vascular: Equal and normal pulses: 2+ peripheral pulses throughout  Musculoskeletal: No joint pain, swelling or deformity; no limitation of movement  Skin: No rashes      I&O's Detail      LABS:                        11.4   7.4   )-----------( 190      ( 19 Mar 2018 07:43 )             35.5     03-19    144  |  106  |  31.0<H>  ----------------------------<  92  4.4   |  22.0  |  0.93    Ca    9.9      19 Mar 2018 07:43  Phos  3.5     03-19  Mg     2.0     03-19            RADIOLOGY & ADDITIONAL STUDIES:

## 2018-03-19 NOTE — OCCUPATIONAL THERAPY INITIAL EVALUATION ADULT - ADDITIONAL COMMENTS
Daughter provided the following history: Pt lives in [facility] in [place] with no steps to enter and all living areas on single level. Bathroom has shower stall with door. Pt required assistance with all self-care/ADL tasks prior to admission. Pt would "shuffle" around with RW. Pt owns RW. Pt is right handed.

## 2018-03-19 NOTE — CONSULT NOTE ADULT - ASSESSMENT
89yo F PMH of S/P Fall    SAH    DENS Fx Type II-- wearing [place] Collar    Pain-Assume Pain is present  Ofirmev now for pain  Tylenol suspension Q8h, tramadol 25mg Q6prn severe  Adding Gabapentin 100 HS for  nerve pain   [place] Collar at all times    Alzheimers Dementia    Anxiety-Noncompliant with taking Meds, eating and a safety risk (MITTS)  >Zoloft to 75mgPO maintenance dose    Dysphagia refusing to eat

## 2018-03-19 NOTE — OCCUPATIONAL THERAPY INITIAL EVALUATION ADULT - PERTINENT HX OF CURRENT PROBLEM, REHAB EVAL
C-spine and head CT revealed Type II Dens fracture and bilateral C1 laminar fracture as well as a trace subarachnoid hemorrhage

## 2018-03-19 NOTE — GOALS OF CARE CONVERSATION - PERSONAL ADVANCE DIRECTIVE - WHAT MATTERS MOST
That she not suffer, and that her Dementia does not exacerbate to the point she needs to be restrained for safety purposes.

## 2018-03-19 NOTE — CONSULT NOTE ADULT - SUBJECTIVE AND OBJECTIVE BOX
89F was admitted on 3/17 after a fall at her assisted living. No LOC. In ED, GCS=13. Workup showed a trace SAH and a Type II Dens fracture with bilateral C1 laminar fracture.     Patient has significant memory deficits and is working with PT at the time of the visit. Limited verbalization, and nonsensical responses. Although did work with therapy and is demonstrating improvement. She reports she has no neck pain and feels just fine.     REVIEW OF SYSTEMS  Constitutional - No fever, No weight loss, No fatigue  HEENT - No eye pain, No visual disturbances, +difficulty hearing, No tinnitus, No vertigo, No neck pain  Respiratory - No cough, No wheezing, No shortness of breath  Cardiovascular - No chest pain, No palpitations  Gastrointestinal - No abdominal pain, No nausea, No vomiting, No diarrhea, No constipation  Genitourinary - No dysuria, No frequency, No hematuria, No incontinence  Neurological - No headaches, +memory loss, No loss of strength, No numbness, No tremors  Skin - No itching, No rashes, No lesions   Endocrine - No temperature intolerance  Musculoskeletal - No joint pain, No joint swelling, No muscle pain  Psychiatric - No depression, No anxiety    PAST MEDICAL & SURGICAL HISTORY  Dementia  Alzheimer's dementia with behavioral disturbance, unspecified timing of dementia onset  Anemia, unspecified type  Hyperlipidemia, unspecified hyperlipidemia type  Anxiety  Hypertension, unspecified type    SOCIAL HISTORY as per medical chart  Smoking - Denied  EtOH - Denied   Drugs - Denied    FUNCTIONAL HISTORY  Lives in assisted living  Unclear about previous functional level    CURRENT FUNCTIONAL STATUS  3/19  Bed Mobility  Bed Mobility Training Sit-to-Supine: moderate assist (50% patient effort);  1 person assist;  verbal cues  Bed Mobility Training Supine-to-Sit: moderate assist (50% patient effort);  1 person assist;  verbal cues  Bed Mobility Training Limitations: impaired ability to control trunk for mobility;  impaired balance;  decreased strength;  cognitive, decreased safety awareness    Sit-Stand Transfer Training  Transfer Training Sit-to-Stand Transfer: moderate assist (50% patient effort);  2 person assist;  verbal cues;  handheld  Transfer Training Stand-to-Sit Transfer: minimum assist (75% patient effort);  2 person assist;  verbal cues;  handheld  Sit-to-Stand Transfer Training Transfer Safety Analysis: decreased balance;  decreased cognition;  impaired balance;  cognitive, decreased safety awareness;  handheld    Gait Training  Gait Training: moderate assist (50% patient effort);  2 person assist;  handheld;  5 feet;  sidestepping, forward, backward  Gait Analysis: 2-point gait   shuffling;  decreased step length;  decreased stride length;  impaired balance;  impaired coordination;  cognitive, decreased safety awareness;  10 feet;  forward, backward, sidestepping;  handheld      FAMILY HISTORY   NC    RECENT LABS/IMAGING  CBC Full  -  ( 19 Mar 2018 07:43 )  WBC Count : 7.4 K/uL  Hemoglobin : 11.4 g/dL  Hematocrit : 35.5 %  Platelet Count - Automated : 190 K/uL  Mean Cell Volume : 92.2 fl  Mean Cell Hemoglobin : 29.6 pg  Mean Cell Hemoglobin Concentration : 32.1 g/dL  Auto Neutrophil # : 6.0 K/uL  Auto Lymphocyte # : 0.9 K/uL  Auto Monocyte # : 0.5 K/uL  Auto Eosinophil # : 0.0 K/uL  Auto Basophil # : x  Auto Neutrophil % : 80.7 %  Auto Lymphocyte % : 11.7 %  Auto Monocyte % : 6.7 %  Auto Eosinophil % : 0.5 %  Auto Basophil % : x    03-19    144  |  106  |  31.0<H>  ----------------------------<  92  4.4   |  22.0  |  0.93    Ca    9.9      19 Mar 2018 07:43  Phos  3.5     03-19  Mg     2.0     03-19          VITALS  T(C): 36.7 (03-19-18 @ 07:57), Max: 37.2 (03-18-18 @ 23:11)  HR: 90 (03-19-18 @ 07:57) (75 - 90)  BP: 145/103 (03-19-18 @ 07:57) (136/78 - 148/86)  RR: 18 (03-19-18 @ 07:57) (18 - 18)  SpO2: 97% (03-19-18 @ 07:57) (91% - 97%)  Wt(kg): --    ALLERGIES  No Known Allergies      MEDICATIONS   acetaminophen    Suspension. 650 milliGRAM(s) Oral every 8 hours  acetaminophen  IVPB. 1000 milliGRAM(s) IV Intermittent once  ALPRAZolam 0.25 milliGRAM(s) Oral two times a day  bisacodyl Suppository 10 milliGRAM(s) Rectal daily PRN  cholecalciferol 1000 Unit(s) Oral daily  enoxaparin Injectable 30 milliGRAM(s) SubCutaneous daily  ferrous    sulfate 325 milliGRAM(s) Oral daily  gabapentin 100 milliGRAM(s) Oral at bedtime  latanoprost 0.005% Ophthalmic Solution 1 Drop(s) Both EYES at bedtime  lisinopril 10 milliGRAM(s) Oral daily  polyethylene glycol 3350 17 Gram(s) Oral at bedtime  risperiDONE   Tablet 0.25 milliGRAM(s) Oral daily  sertraline 50 milliGRAM(s) Oral daily  sertraline 25 milliGRAM(s) Oral daily  traMADol 25 milliGRAM(s) Oral every 4 hours PRN      ----------------------------------------------------------------------------------------  PHYSICAL EXAM  Constitutional - NAD, Comfortable  HEENT - Left periorbital ecchymosis  Neck - C collar  Chest - Breathing comfortably, No wheezing  Cardiovascular - S1S2   Abdomen - Soft   Extremities - No C/C/E, No calf tenderness, + Bilateral mittens  Neurologic Exam -                    Cognitive - Awake, Alert, AAO to self      Communication - Fluent, No dysarthria     Motor - Limited exam, patient is comfortable with her arms down and legs straight and does not feel the need to move them.   Psychiatric - Mood positive, cooperative  ----------------------------------------------------------------------------------------  ASSESSMENT/PLAN  89yFemale with functional deficits after sustaining cervical fractures and SAH  Pain - Tylenol, Neurontin, Tramadol  DVT PPX - Lovenox  Rehab - Recommend ACUTE inpatient rehabilitation for the functional deficits consisting of 3 hours of therapy/day & 24 hour RN/daily PMR physician for comorbid medical management. Will continue to follow for ongoing rehab needs and recommendations.

## 2018-03-20 ENCOUNTER — TRANSCRIPTION ENCOUNTER (OUTPATIENT)
Age: 83
End: 2018-03-20

## 2018-03-20 PROCEDURE — 99232 SBSQ HOSP IP/OBS MODERATE 35: CPT

## 2018-03-20 RX ORDER — LISINOPRIL 2.5 MG/1
1 TABLET ORAL
Qty: 0 | Refills: 0 | COMMUNITY

## 2018-03-20 RX ORDER — ACETAMINOPHEN 500 MG
20.31 TABLET ORAL
Qty: 0 | Refills: 0 | COMMUNITY
Start: 2018-03-20

## 2018-03-20 RX ORDER — ALPRAZOLAM 0.25 MG
1 TABLET ORAL
Qty: 0 | Refills: 0 | COMMUNITY
Start: 2018-03-20

## 2018-03-20 RX ORDER — LATANOPROST 0.05 MG/ML
1 SOLUTION/ DROPS OPHTHALMIC; TOPICAL
Qty: 0 | Refills: 0 | COMMUNITY
Start: 2018-03-20

## 2018-03-20 RX ORDER — SERTRALINE 25 MG/1
1 TABLET, FILM COATED ORAL
Qty: 0 | Refills: 0 | COMMUNITY
Start: 2018-03-20

## 2018-03-20 RX ORDER — TRAMADOL HYDROCHLORIDE 50 MG/1
0.5 TABLET ORAL
Qty: 0 | Refills: 0 | COMMUNITY
Start: 2018-03-20

## 2018-03-20 RX ORDER — GABAPENTIN 400 MG/1
1 CAPSULE ORAL
Qty: 0 | Refills: 0 | COMMUNITY
Start: 2018-03-20

## 2018-03-20 RX ORDER — LISINOPRIL 2.5 MG/1
1 TABLET ORAL
Qty: 0 | Refills: 0 | COMMUNITY
Start: 2018-03-20

## 2018-03-20 RX ORDER — SERTRALINE 25 MG/1
0 TABLET, FILM COATED ORAL
Qty: 0 | Refills: 0 | COMMUNITY

## 2018-03-20 RX ORDER — RISPERIDONE 4 MG/1
0 TABLET ORAL
Qty: 0 | Refills: 0 | COMMUNITY

## 2018-03-20 RX ORDER — LATANOPROST 0.05 MG/ML
1 SOLUTION/ DROPS OPHTHALMIC; TOPICAL
Qty: 0 | Refills: 0 | COMMUNITY

## 2018-03-20 RX ORDER — RISPERIDONE 4 MG/1
1 TABLET ORAL
Qty: 0 | Refills: 0 | COMMUNITY
Start: 2018-03-20

## 2018-03-20 RX ORDER — CHOLECALCIFEROL (VITAMIN D3) 125 MCG
1000 CAPSULE ORAL
Qty: 0 | Refills: 0 | COMMUNITY
Start: 2018-03-20

## 2018-03-20 RX ORDER — FENTANYL CITRATE 50 UG/ML
1 INJECTION INTRAVENOUS
Qty: 0 | Refills: 0 | COMMUNITY
Start: 2018-03-20

## 2018-03-20 RX ORDER — POLYETHYLENE GLYCOL 3350 17 G/17G
17 POWDER, FOR SOLUTION ORAL
Qty: 0 | Refills: 0 | COMMUNITY
Start: 2018-03-20

## 2018-03-20 RX ORDER — ASPIRIN/CALCIUM CARB/MAGNESIUM 324 MG
1 TABLET ORAL
Qty: 0 | Refills: 0 | COMMUNITY

## 2018-03-20 RX ADMIN — Medication 650 MILLIGRAM(S): at 06:15

## 2018-03-20 RX ADMIN — Medication 650 MILLIGRAM(S): at 12:41

## 2018-03-20 RX ADMIN — SERTRALINE 50 MILLIGRAM(S): 25 TABLET, FILM COATED ORAL at 12:41

## 2018-03-20 RX ADMIN — Medication 650 MILLIGRAM(S): at 13:41

## 2018-03-20 RX ADMIN — Medication 650 MILLIGRAM(S): at 05:45

## 2018-03-20 RX ADMIN — Medication 1000 UNIT(S): at 12:41

## 2018-03-20 RX ADMIN — Medication 0.25 MILLIGRAM(S): at 18:06

## 2018-03-20 RX ADMIN — LISINOPRIL 10 MILLIGRAM(S): 2.5 TABLET ORAL at 05:45

## 2018-03-20 RX ADMIN — Medication 325 MILLIGRAM(S): at 12:41

## 2018-03-20 RX ADMIN — ENOXAPARIN SODIUM 30 MILLIGRAM(S): 100 INJECTION SUBCUTANEOUS at 12:42

## 2018-03-20 RX ADMIN — Medication 650 MILLIGRAM(S): at 22:00

## 2018-03-20 RX ADMIN — RISPERIDONE 0.25 MILLIGRAM(S): 4 TABLET ORAL at 12:41

## 2018-03-20 RX ADMIN — Medication 0.25 MILLIGRAM(S): at 23:24

## 2018-03-20 RX ADMIN — Medication 650 MILLIGRAM(S): at 21:22

## 2018-03-20 RX ADMIN — TRAMADOL HYDROCHLORIDE 25 MILLIGRAM(S): 50 TABLET ORAL at 13:41

## 2018-03-20 RX ADMIN — POLYETHYLENE GLYCOL 3350 17 GRAM(S): 17 POWDER, FOR SOLUTION ORAL at 23:25

## 2018-03-20 RX ADMIN — LATANOPROST 1 DROP(S): 0.05 SOLUTION/ DROPS OPHTHALMIC; TOPICAL at 23:25

## 2018-03-20 RX ADMIN — Medication 0.25 MILLIGRAM(S): at 12:41

## 2018-03-20 RX ADMIN — GABAPENTIN 100 MILLIGRAM(S): 400 CAPSULE ORAL at 23:25

## 2018-03-20 RX ADMIN — TRAMADOL HYDROCHLORIDE 25 MILLIGRAM(S): 50 TABLET ORAL at 12:41

## 2018-03-20 RX ADMIN — SERTRALINE 25 MILLIGRAM(S): 25 TABLET, FILM COATED ORAL at 12:41

## 2018-03-20 RX ADMIN — Medication 0.25 MILLIGRAM(S): at 05:45

## 2018-03-20 NOTE — PROGRESS NOTE ADULT - SUBJECTIVE AND OBJECTIVE BOX
Pt seen and examined bedside. Pt awake and arousable, cervical collar in place without any sign of focal deficit    Vital Signs Last 24 Hrs  T(C): 36.6 (20 Mar 2018 07:00), Max: 36.7 (19 Mar 2018 23:27)  T(F): 97.9 (20 Mar 2018 07:00), Max: 98 (19 Mar 2018 23:27)  HR: 67 (20 Mar 2018 07:00) (67 - 87)  BP: 135/75 (20 Mar 2018 07:00) (135/75 - 159/83)  BP(mean): --  RR: 18 (20 Mar 2018 07:00) (18 - 20)  SpO2: 97% (19 Mar 2018 23:27) (97% - 97%)    NAD, Awake and Alert  Chest expansion symmetric  Lungs CTAB  RRR, S1S2nl  Abd soft, ND, NTTP  Pelvis stable      89 y/o F PMH Alzheimer's dementia, HTN, s/p fall found with L frontal SAH and type 2 odontoid fracture  -hemodynamically/neurologically stable  -NeuoSx reccs holding aspirin x1 week.     Plan:  -C collar at all times. Will f.u up with NeuroSx when collar can be removed  -Palliative care was consulted:  [facility], Pain to be managed more aggressively.   Do not send to hospital unless pain or severe symptoms cannot be otherwise controlled	  -f/u with PT  -continue with home medications

## 2018-03-20 NOTE — GOALS OF CARE CONVERSATION - PERSONAL ADVANCE DIRECTIVE - NS PRO AD PATIENT TYPE ON CHART
Health Care Proxy (HCP)/Do Not Resuscitate (DNR)/Medical Orders for Life-Sustaining Treatment (MOLST)
Medical Orders for Life-Sustaining Treatment (MOLST)/Health Care Proxy (HCP)/Do Not Resuscitate (DNR)

## 2018-03-20 NOTE — DISCHARGE NOTE ADULT - CARE PLAN
Principal Discharge DX:	Dens fracture, closed, initial encounter  Goal:	Alleviation of pain and symptoms  Assessment and plan of treatment:	Follow up: Please call and make an appointment with DR. WILKINS 2 weeks after discharge. Also, please call and make an appointment with your primary care physician as per your usual schedule.   Activity: Cervical collar at all times.   Diet: Pureed diet with thin liquids  Medications: Please take all home medications as prescribed by your primary care doctor. DO NOT restart aspirin. Continue the remainder of your medications as listed above.  Wound Care: Please, keep wound site clean and dry. You may shower, but do not bathe.   Patient is advised to RETURN TO THE EMERGENCY DEPARTMENT for any of the following - worsening pain, fever/chills, nausea/vomiting, altered mental status, chest pain, shortness of breath, or any other new / worsening symptom.  Secondary Diagnosis:	Subarachnoid hemorrhage  Secondary Diagnosis:	Alzheimer's dementia with behavioral disturbance, unspecified timing of dementia onset Principal Discharge DX:	Dens fracture, closed, initial encounter  Goal:	Alleviation of pain and symptoms  Assessment and plan of treatment:	Follow up: Please call and make an appointment with DR. WILKINS 2 weeks after discharge. Also, please call and make an appointment with your primary care physician as per your usual schedule.   Activity: Cervical collar at all times.   Diet: Pureed diet with thin liquids. Please continue 1:1 supervision with all meals, aspiration precautions.  Medications: Please take all home medications as prescribed by your primary care doctor. DO NOT restart aspirin. Continue the remainder of your medications as listed above.  Wound Care: Please, keep wound site clean and dry. You may shower, but do not bathe.   Patient is advised to RETURN TO THE EMERGENCY DEPARTMENT for any of the following - worsening pain, fever/chills, nausea/vomiting, altered mental status, chest pain, shortness of breath, or any other new / worsening symptom.  Secondary Diagnosis:	Subarachnoid hemorrhage  Secondary Diagnosis:	Alzheimer's dementia with behavioral disturbance, unspecified timing of dementia onset Principal Discharge DX:	Dens fracture, closed, initial encounter  Goal:	Alleviation of pain and symptoms  Assessment and plan of treatment:	Follow up: Please call and make an appointment with DR. WILKINS 2 weeks after discharge. Also, please call and make an appointment with your primary care physician as per your usual schedule.   Activity: Cervical collar at all times.   Diet: Pureed diet with thin liquids. Please continue 1:1 supervision with all meals, aspiration precautions.  Medications: Please take all home medications as prescribed by your primary care doctor. DO NOT restart aspirin. Continue the remainder of your medications as listed above.  Wound Care: Please, keep wound site clean and dry. You may shower, but do not bathe.   Patient is advised to RETURN TO THE EMERGENCY DEPARTMENT for any of the following - worsening pain, fever/chills, nausea/vomiting, altered mental status, chest pain, shortness of breath, or any other new / worsening symptom.  Secondary Diagnosis:	Subarachnoid hemorrhage  Secondary Diagnosis:	Alzheimer's dementia with behavioral disturbance, unspecified timing of dementia onset  Secondary Diagnosis:	Urinary retention  Assessment and plan of treatment:	You are being discharged with a anne cathter. Please continue flomax and repeat trial of void on 3/23. Principal Discharge DX:	Dens fracture, closed, initial encounter  Goal:	Alleviation of pain and symptoms  Assessment and plan of treatment:	Follow up: Please call and make an appointment with DR. WILKINS 2 weeks after discharge. Also, please call and make an appointment with your primary care physician as per your usual schedule.   Activity: Cervical collar at all times.   Diet: Pureed diet with thin liquids. Please continue 1:1 supervision with all meals, aspiration precautions.  Medications: Please take all home medications as prescribed by your primary care doctor. The neurosurgeons who saw you in the hospital recommend you DO NOT restart your 81mg aspirin until 1 week after your injury. It may be restarted on 3/24/18. Continue the remainder of your medications as listed above.  Wound Care: Please, keep wound site clean and dry. You may shower, but do not bathe.   Patient is advised to RETURN TO THE EMERGENCY DEPARTMENT for any of the following - worsening pain, fever/chills, nausea/vomiting, altered mental status, chest pain, shortness of breath, or any other new / worsening symptom.  Secondary Diagnosis:	Subarachnoid hemorrhage  Secondary Diagnosis:	Alzheimer's dementia with behavioral disturbance, unspecified timing of dementia onset  Secondary Diagnosis:	Urinary retention  Assessment and plan of treatment:	You are being discharged with a anne cathter. Please continue flomax and repeat trial of void on 3/23. Principal Discharge DX:	Dens fracture, closed, initial encounter  Goal:	Alleviation of pain and symptoms  Assessment and plan of treatment:	Follow up: Please call and make an appointment with DR. WILKINS 2 weeks after discharge. Also, please call and make an appointment with your primary care physician as per your usual schedule.   Activity: Cervical collar at all times.   Diet: Pureed diet with thin liquids. Please continue 1:1 supervision with all meals, aspiration precautions.  Medications: Please take all home medications as prescribed by your primary care doctor. The neurosurgeons who saw you in the hospital recommend you DO NOT restart your 81mg aspirin until 1 week after your injury. It may be restarted on 3/24/18. Continue the remainder of your medications as listed above.  Wound Care: Please, keep wound site clean and dry. You may shower, but do not bathe.   Patient is advised to RETURN TO THE EMERGENCY DEPARTMENT for any of the following - worsening pain, fever/chills, nausea/vomiting, altered mental status, chest pain, shortness of breath, or any other new / worsening symptom.  Secondary Diagnosis:	Subarachnoid hemorrhage  Secondary Diagnosis:	Alzheimer's dementia with behavioral disturbance, unspecified timing of dementia onset  Secondary Diagnosis:	Urinary retention  Assessment and plan of treatment:	You are being discharged with a anne cathter. Please continue flomax and repeat trial of void on 3/23. If Patient retains, can straight cath.

## 2018-03-20 NOTE — GOALS OF CARE CONVERSATION - PERSONAL ADVANCE DIRECTIVE - NS PRO AD PATIENT TYPE
Do Not Resuscitate (DNR)/Medical Orders for Life-Sustaining Treatment (MOLST)
Do Not Resuscitate (DNR)/Medical Orders for Life-Sustaining Treatment (MOLST)

## 2018-03-20 NOTE — DISCHARGE NOTE ADULT - CARE PROVIDER_API CALL
Jeromy Alan (MD; PhD), Neurological Surgery  [address] [address]  Phone: [phone]  Fax: [phone]

## 2018-03-20 NOTE — DISCHARGE NOTE ADULT - PATIENT PORTAL LINK FT
You can access the [facility] Patient Portal, offered by [facility], by registering with the following website: [email or web address]

## 2018-03-20 NOTE — GOALS OF CARE CONVERSATION - PERSONAL ADVANCE DIRECTIVE - CONVERSATION DETAILS
Hospice services explained to patient's daughter and son in law. Patient had been residing at the [facility] prior to admission. As per Neda at [facility] the patient cannot return to there facility without 24/7 care. Family made aware. They are requesting that the patient go to a [facility] before returning to the [facility]. Social work and case management made aware . Hospice contact information given to family for after patient completes rehab. Veena SUAREZ
Patient has been a resident of [facility] past 4 years.  She has advanced Dementia (8yrs) has been falling past few months eight times.  Last fall over the walker fracturing her neck, [place] collar causing distress, unable to self report pain and discomfort  refusing to eat or take oral medications.    Daughter shared course of her mother's illness, and is realistic about her prognosis following this fall.  She will be chair/bedbound, more agitated poor quality of life  'They prefer she return to [facility]  Pain to be managed more aggressively.

## 2018-03-20 NOTE — DISCHARGE NOTE ADULT - PLAN OF CARE
Alleviation of pain and symptoms Follow up: Please call and make an appointment with DR. WILKINS 2 weeks after discharge. Also, please call and make an appointment with your primary care physician as per your usual schedule.   Activity: Cervical collar at all times.   Diet: Pureed diet with thin liquids  Medications: Please take all home medications as prescribed by your primary care doctor. DO NOT restart aspirin. Continue the remainder of your medications as listed above.  Wound Care: Please, keep wound site clean and dry. You may shower, but do not bathe.   Patient is advised to RETURN TO THE EMERGENCY DEPARTMENT for any of the following - worsening pain, fever/chills, nausea/vomiting, altered mental status, chest pain, shortness of breath, or any other new / worsening symptom. Follow up: Please call and make an appointment with DR. WILKINS 2 weeks after discharge. Also, please call and make an appointment with your primary care physician as per your usual schedule.   Activity: Cervical collar at all times.   Diet: Pureed diet with thin liquids. Please continue 1:1 supervision with all meals, aspiration precautions.  Medications: Please take all home medications as prescribed by your primary care doctor. DO NOT restart aspirin. Continue the remainder of your medications as listed above.  Wound Care: Please, keep wound site clean and dry. You may shower, but do not bathe.   Patient is advised to RETURN TO THE EMERGENCY DEPARTMENT for any of the following - worsening pain, fever/chills, nausea/vomiting, altered mental status, chest pain, shortness of breath, or any other new / worsening symptom. You are being discharged with a anne cathter. Please continue flomax and repeat trial of void on 3/23. Follow up: Please call and make an appointment with DR. WILKINS 2 weeks after discharge. Also, please call and make an appointment with your primary care physician as per your usual schedule.   Activity: Cervical collar at all times.   Diet: Pureed diet with thin liquids. Please continue 1:1 supervision with all meals, aspiration precautions.  Medications: Please take all home medications as prescribed by your primary care doctor. The neurosurgeons who saw you in the hospital recommend you DO NOT restart your 81mg aspirin until 1 week after your injury. It may be restarted on 3/24/18. Continue the remainder of your medications as listed above.  Wound Care: Please, keep wound site clean and dry. You may shower, but do not bathe.   Patient is advised to RETURN TO THE EMERGENCY DEPARTMENT for any of the following - worsening pain, fever/chills, nausea/vomiting, altered mental status, chest pain, shortness of breath, or any other new / worsening symptom. You are being discharged with a anne cathter. Please continue flomax and repeat trial of void on 3/23. If Patient retains, can straight cath.

## 2018-03-20 NOTE — DISCHARGE NOTE ADULT - NS AS ACTIVITY OBS
No Heavy lifting/straining No Heavy lifting/straining/Do not make important decisions/Do not drive or operate machinery

## 2018-03-20 NOTE — PROGRESS NOTE ADULT - SUBJECTIVE AND OBJECTIVE BOX
Patient in bed, smiling. Mittens are off.   She reports no pain. Moves all her extremities.     FUNCTIONAL PROGRESS  3/19  Bed Mobility  Bed Mobility Training Sit-to-Supine: moderate assist (50% patient effort);  1 person assist;  verbal cues  Bed Mobility Training Supine-to-Sit: moderate assist (50% patient effort);  1 person assist;  verbal cues  Bed Mobility Training Limitations: impaired ability to control trunk for mobility;  impaired balance;  decreased strength;  cognitive, decreased safety awareness    Sit-Stand Transfer Training  Transfer Training Sit-to-Stand Transfer: moderate assist (50% patient effort);  2 person assist;  verbal cues;  handheld  Transfer Training Stand-to-Sit Transfer: minimum assist (75% patient effort);  2 person assist;  verbal cues;  handheld  Sit-to-Stand Transfer Training Transfer Safety Analysis: decreased balance;  decreased cognition;  impaired balance;  cognitive, decreased safety awareness;  handheld    Gait Training  Gait Training: moderate assist (50% patient effort);  2 person assist;  handheld;  5 feet;  sidestepping, forward, backward  Gait Analysis: 2-point gait   shuffling;  decreased step length;  decreased stride length;  impaired balance;  impaired coordination;  cognitive, decreased safety awareness;  10 feet;  forward, backward, sidestepping;  handheld    REVIEW OF SYSTEMS  Constitutional - No fever,  +fatigue  HEENT - No neck pain  Neurological - No headaches, +memory loss    VITALS  T(C): 36.6 (03-20-18 @ 07:00), Max: 36.7 (03-19-18 @ 23:27)  HR: 67 (03-20-18 @ 07:00) (67 - 87)  BP: 135/75 (03-20-18 @ 07:00) (135/75 - 159/83)  RR: 18 (03-20-18 @ 07:00) (18 - 20)  SpO2: 97% (03-19-18 @ 23:27) (97% - 97%)  Wt(kg): --    MEDICATIONS   acetaminophen    Suspension. 650 milliGRAM(s) every 8 hours  ALPRAZolam 0.25 milliGRAM(s) every 6 hours  bisacodyl Suppository 10 milliGRAM(s) daily PRN  cholecalciferol 1000 Unit(s) daily  enoxaparin Injectable 30 milliGRAM(s) daily  fentaNYL   Patch  12 MICROgram(s)/Hr 1 Patch every 72 hours  ferrous    sulfate 325 milliGRAM(s) daily  gabapentin 100 milliGRAM(s) at bedtime  latanoprost 0.005% Ophthalmic Solution 1 Drop(s) at bedtime  lisinopril 10 milliGRAM(s) daily  polyethylene glycol 3350 17 Gram(s) at bedtime  risperiDONE   Tablet 0.25 milliGRAM(s) daily  sertraline 50 milliGRAM(s) daily  sertraline 25 milliGRAM(s) daily  traMADol 25 milliGRAM(s) every 4 hours PRN      RECENT LABS/IMAGING  CBC Full  -  ( 19 Mar 2018 07:43 )  WBC Count : 7.4 K/uL  Hemoglobin : 11.4 g/dL  Hematocrit : 35.5 %  Platelet Count - Automated : 190 K/uL  Mean Cell Volume : 92.2 fl  Mean Cell Hemoglobin : 29.6 pg  Mean Cell Hemoglobin Concentration : 32.1 g/dL  Auto Neutrophil # : 6.0 K/uL  Auto Lymphocyte # : 0.9 K/uL  Auto Monocyte # : 0.5 K/uL  Auto Eosinophil # : 0.0 K/uL  Auto Basophil # : x  Auto Neutrophil % : 80.7 %  Auto Lymphocyte % : 11.7 %  Auto Monocyte % : 6.7 %  Auto Eosinophil % : 0.5 %  Auto Basophil % : x    03-19    144  |  106  |  31.0<H>  ----------------------------<  92  4.4   |  22.0  |  0.93    Ca    9.9      19 Mar 2018 07:43  Phos  3.5     03-19  Mg     2.0     03-19      --------------------------------------------------------------------  PHYSICAL EXAM  Constitutional - NAD, Comfortable  HEENT - Left periorbital ecchymosis  Neck - C collar  Extremities - No C/C/E, No calf tenderness   Neurologic Exam -                    Cognitive - Awake, Alert, AAO to self      Communication - Perseverative on feeling ok     Motor - Able to lift BUE and BLE - inconsistently to commands  Psychiatric - Cooperative  ----------------------------------------------------------------------------------------  ASSESSMENT/PLAN  89yFemale with functional deficits after sustaining cervical fractures and SAH  Pain - Tylenol, Neurontin, Tramadol, Fentanyl  DVT PPX - Lovenox  Rehab - Recommend ACUTE inpatient rehabilitation for the functional deficits consisting of 3 hours of therapy/day & 24 hour RN/daily PMR physician for comorbid medical management. Will continue to follow for ongoing rehab needs and recommendations.

## 2018-03-20 NOTE — DISCHARGE NOTE ADULT - HOSPITAL COURSE
89yo female with history of Alzheimer's and dementia presents to hospital after fall. Per patient chart, she is a resident at [facility] where while walking with walker she tripped and fell. Fall was witnessed. No LOC reported. She sustained a laceration to lateral aspect of left supraorbital. In ED, a CT Cspine and Head were evident for Type II Dens fracture and bilateral C1 laminar fracture as as well as a trace subarachnoid hemorhage. Trauma Surgery was consulted for found fractures.     Hospital Course: Patient was admitted to SICU for serial neuro exams, neurosx consulted. Neurosx recommend C-collar at all times and requesting MRI however pt agitated, pulling at things, would likely require sedation and intubation for MRI. D/w family regarding risks vs. benefits - decision made not to proceed w. MRI. Repeat CT head 6 hours after initial showed that SAH was stable. Patient was downgraded to floor on 3/18 in stable condition. Speech pathology, physical & occupational therapy, physiatry, and palliative care consulted. Patient put on pureed diet with thin liquids, requires 1:1 assistance with meals. Per palliative care consulted, patient's family prefer she return to [facility] on [facility]ospice Care where she has resided for 4 years. At time of d/c to hospice patient is hemodynamically well, pain controlled, tolerating diet. 89yo female with history of Alzheimer's and dementia presents to hospital after fall. Per patient chart, she is a resident at [facility] where while walking with walker she tripped and fell. Fall was witnessed. No LOC reported. She sustained a laceration to lateral aspect of left supraorbital. In ED, a CT Cspine and Head were evident for Type II Dens fracture and bilateral C1 laminar fracture as as well as a trace subarachnoid hemorhage. Trauma Surgery was consulted for found fractures.     Hospital Course: Patient was admitted to SICU for serial neuro exams, neurosx consulted. Neurosx recommend C-collar at all times and requesting MRI however pt agitated, pulling at things, would likely require sedation and intubation for MRI. D/w family regarding risks vs. benefits - decision made not to proceed w. MRI. Repeat CT head 6 hours after initial showed that SAH was stable. Patient was downgraded to floor on 3/18 in stable condition. Speech pathology, physical & occupational therapy, physiatry, and palliative care consulted. Patient put on pureed diet with thin liquids, requires 1:1 assistance with meals. Per palliative care consulted, patient's family prefer she return to [facility] where she has resided for 4 years. At time of d/c patient is hemodynamically well, pain controlled, tolerating diet.    Patient is advised to RETURN TO THE EMERGENCY DEPARTMENT for any of the following - worsening pain, fever/chills, nausea/vomiting, altered mental status, chest pain, shortness of breath, or any other new / worsening symptom. 89yo female with history of Alzheimer's and dementia presents to hospital after fall. Per patient chart, she is a resident at [facility] where while walking with walker she tripped and fell. Fall was witnessed. No LOC reported. She sustained a laceration to lateral aspect of left supraorbital. In ED, a CT Cspine and Head were evident for Type II Dens fracture and bilateral C1 laminar fracture as as well as a trace subarachnoid hemorhage. Trauma Surgery was consulted for found fractures.     Hospital Course: Patient was admitted to SICU for serial neuro exams, neurosx consulted. Neurosx recommend C-collar at all times and requesting MRI however pt agitated, pulling at things, would likely require sedation and intubation for MRI. D/w family regarding risks vs. benefits - decision made not to proceed w. MRI. Repeat CT head 6 hours after initial showed that SAH was stable. Patient was downgraded to floor on 3/18 in stable condition. No further decline in neurologic function or new focal deficits. Speech pathology, physical & occupational therapy, physiatry, and palliative care consulted. Patient put on pureed diet with thin liquids, requires 1:1 assistance with meals. Palliative care consulted - after meeting with family, decision made for discharge to [facility]. At time of d/c patient is hemodynamically well, pain controlled, tolerating diet.    Patient is advised to RETURN TO THE EMERGENCY DEPARTMENT for any of the following - worsening pain, fever/chills, nausea/vomiting, altered mental status, chest pain, shortness of breath, or any other new / worsening symptom. 89yo female with history of Alzheimer's and dementia presents to hospital after fall. Per patient chart, she is a resident at [facility] where while walking with walker she tripped and fell. Fall was witnessed. No LOC reported. She sustained a laceration to lateral aspect of left supraorbital. In ED, a CT Cspine and Head were evident for Type II Dens fracture and bilateral C1 laminar fracture as as well as a trace subarachnoid hemorhage. Trauma Surgery was consulted for found fractures.     Hospital Course: Patient was admitted to SICU for serial neuro exams, neurosx consulted. Neurosx recommend C-collar at all times and requesting MRI however pt agitated, pulling at things, would likely require sedation and intubation for MRI. D/w family regarding risks vs. benefits - decision made not to proceed w. MRI. Repeat CT head 6 hours after initial showed that SAH was stable. Patient was downgraded to floor on 3/18 in stable condition. No further decline in neurologic function or new focal deficits. Speech pathology, physical & occupational therapy, physiatry, and palliative care consulted. Patient put on pureed diet with thin liquids, requires 1:1 assistance with meals. Palliative care consulted and met with family multiple times to discuss goals of care. Pt went into urinary retention for which anne catheter placed, flomax started. Instruction to d/c anne and repeat TOV 48 hours after insertion. At time of d/c patient is hemodynamically well, pain controlled, tolerating diet.    Patient is advised to RETURN TO THE EMERGENCY DEPARTMENT for any of the following - worsening pain, fever/chills, nausea/vomiting, altered mental status, chest pain, shortness of breath, or any other new / worsening symptom.

## 2018-03-20 NOTE — DISCHARGE NOTE ADULT - MEDICATION SUMMARY - MEDICATIONS TO TAKE
I will START or STAY ON the medications listed below when I get home from the hospital:    acetaminophen 160 mg/5 mL oral suspension  -- 20.31 milliliter(s) by mouth every 8 hours  -- Indication: For Pain    fentaNYL 12 mcg/hr transdermal film, extended release  -- 1 patch by transdermal patch every 72 hours  -- Indication: For Pain    traMADol 50 mg oral tablet  -- 0.5 tab(s) by mouth every 4 hours, As needed, Severe Pain (7 - 10)  -- Indication: For Pain    lisinopril 10 mg oral tablet  -- 1 tab(s) by mouth once a day  -- Indication: For Hypertension, unspecified type    gabapentin 100 mg oral capsule  -- 1 cap(s) by mouth once a day (at bedtime)  -- Indication: For Pain    sertraline 25 mg oral tablet  -- 1 tab(s) by mouth once a day  -- Indication: For Dementia    sertraline 50 mg oral tablet  -- 1 tab(s) by mouth once a day  -- Indication: For Dementia    risperiDONE 0.25 mg oral tablet  -- 1 tab(s) by mouth once a day  -- Indication: For Dementia    ALPRAZolam 0.25 mg oral tablet  -- 1 tab(s) by mouth every 6 hours  -- Indication: For Dementia    ferrous sulfate 325 mg (65 mg elemental iron) oral tablet  -- 1 tab(s) by mouth 2 times a day  -- Indication: For Nutritional supplement    bisacodyl 10 mg rectal suppository  -- 1 suppository(ies) rectally once a day, As needed, Constipation  -- Indication: For Bowel regimen    polyethylene glycol 3350 oral powder for reconstitution  -- 17 gram(s) by mouth once a day (at bedtime)  -- Indication: For Bowel regimen    latanoprost 0.005% ophthalmic solution  -- 1 drop(s) to each affected eye once a day (at bedtime)  -- Indication: For Ophthalamic    cholecalciferol oral tablet  -- 1000 unit(s) by mouth once a day  -- Indication: For Nutritional supplement I will START or STAY ON the medications listed below when I get home from the hospital:    acetaminophen 160 mg/5 mL oral suspension  -- 20.31 milliliter(s) by mouth every 8 hours  -- Indication: For Pain    fentaNYL 12 mcg/hr transdermal film, extended release  -- 1 patch by transdermal patch every 72 hours  -- Indication: For Pain    traMADol 50 mg oral tablet  -- 0.5 tab(s) by mouth every 4 hours, As needed, Severe Pain (7 - 10)  -- Indication: For Pain    lisinopril 10 mg oral tablet  -- 1 tab(s) by mouth once a day  -- Indication: For Hypertension, unspecified type    tamsulosin 0.4 mg oral capsule  -- 1 cap(s) by mouth once a day (at bedtime)  -- Indication: For Urinary retention    gabapentin 100 mg oral capsule  -- 1 cap(s) by mouth once a day (at bedtime)  -- Indication: For Pain    sertraline 25 mg oral tablet  -- 1 tab(s) by mouth once a day  -- Indication: For Dementia    sertraline 50 mg oral tablet  -- 1 tab(s) by mouth once a day  -- Indication: For Dementia    risperiDONE 0.25 mg oral tablet  -- 1 tab(s) by mouth once a day  -- Indication: For Dementia    ALPRAZolam 0.25 mg oral tablet  -- 1 tab(s) by mouth every 6 hours  -- Indication: For Dementia    ferrous sulfate 325 mg (65 mg elemental iron) oral tablet  -- 1 tab(s) by mouth 2 times a day  -- Indication: For Nutritional supplement    bisacodyl 10 mg rectal suppository  -- 1 suppository(ies) rectally once a day, As needed, Constipation  -- Indication: For Bowel regimen    polyethylene glycol 3350 oral powder for reconstitution  -- 17 gram(s) by mouth once a day (at bedtime)  -- Indication: For Bowel regimen    latanoprost 0.005% ophthalmic solution  -- 1 drop(s) to each affected eye once a day (at bedtime)  -- Indication: For Ophthalamic    cholecalciferol oral tablet  -- 1000 unit(s) by mouth once a day  -- Indication: For Nutritional supplement

## 2018-03-20 NOTE — DISCHARGE NOTE ADULT - SECONDARY DIAGNOSIS.
Subarachnoid hemorrhage Alzheimer's dementia with behavioral disturbance, unspecified timing of dementia onset Urinary retention

## 2018-03-21 DIAGNOSIS — S12.100A UNSPECIFIED DISPLACED FRACTURE OF SECOND CERVICAL VERTEBRA, INITIAL ENCOUNTER FOR CLOSED FRACTURE: ICD-10-CM

## 2018-03-21 PROCEDURE — 99232 SBSQ HOSP IP/OBS MODERATE 35: CPT

## 2018-03-21 RX ORDER — TAMSULOSIN HYDROCHLORIDE 0.4 MG/1
0.4 CAPSULE ORAL AT BEDTIME
Qty: 0 | Refills: 0 | Status: DISCONTINUED | OUTPATIENT
Start: 2018-03-21 | End: 2018-03-22

## 2018-03-21 RX ORDER — TAMSULOSIN HYDROCHLORIDE 0.4 MG/1
1 CAPSULE ORAL
Qty: 0 | Refills: 0 | COMMUNITY
Start: 2018-03-21

## 2018-03-21 RX ADMIN — SERTRALINE 25 MILLIGRAM(S): 25 TABLET, FILM COATED ORAL at 11:06

## 2018-03-21 RX ADMIN — TRAMADOL HYDROCHLORIDE 25 MILLIGRAM(S): 50 TABLET ORAL at 08:30

## 2018-03-21 RX ADMIN — Medication 1000 UNIT(S): at 11:07

## 2018-03-21 RX ADMIN — Medication 0.25 MILLIGRAM(S): at 17:22

## 2018-03-21 RX ADMIN — Medication 650 MILLIGRAM(S): at 11:45

## 2018-03-21 RX ADMIN — Medication 0.25 MILLIGRAM(S): at 11:06

## 2018-03-21 RX ADMIN — TRAMADOL HYDROCHLORIDE 25 MILLIGRAM(S): 50 TABLET ORAL at 07:45

## 2018-03-21 RX ADMIN — Medication 650 MILLIGRAM(S): at 23:30

## 2018-03-21 RX ADMIN — Medication 0.25 MILLIGRAM(S): at 22:34

## 2018-03-21 RX ADMIN — Medication 325 MILLIGRAM(S): at 11:07

## 2018-03-21 RX ADMIN — POLYETHYLENE GLYCOL 3350 17 GRAM(S): 17 POWDER, FOR SOLUTION ORAL at 22:33

## 2018-03-21 RX ADMIN — GABAPENTIN 100 MILLIGRAM(S): 400 CAPSULE ORAL at 22:33

## 2018-03-21 RX ADMIN — RISPERIDONE 0.25 MILLIGRAM(S): 4 TABLET ORAL at 11:06

## 2018-03-21 RX ADMIN — TAMSULOSIN HYDROCHLORIDE 0.4 MILLIGRAM(S): 0.4 CAPSULE ORAL at 22:33

## 2018-03-21 RX ADMIN — LATANOPROST 1 DROP(S): 0.05 SOLUTION/ DROPS OPHTHALMIC; TOPICAL at 22:33

## 2018-03-21 RX ADMIN — Medication 650 MILLIGRAM(S): at 11:06

## 2018-03-21 RX ADMIN — Medication 650 MILLIGRAM(S): at 22:32

## 2018-03-21 RX ADMIN — Medication 0.25 MILLIGRAM(S): at 07:46

## 2018-03-21 RX ADMIN — SERTRALINE 50 MILLIGRAM(S): 25 TABLET, FILM COATED ORAL at 11:07

## 2018-03-21 RX ADMIN — ENOXAPARIN SODIUM 30 MILLIGRAM(S): 100 INJECTION SUBCUTANEOUS at 11:06

## 2018-03-21 NOTE — PROGRESS NOTE ADULT - PROBLEM SELECTOR PLAN 1
Hospice referral made, comfort feeds await dispo, meds as tolerated Collar at all times, cont meds/diet, await CHUYITA placement

## 2018-03-21 NOTE — PROGRESS NOTE ADULT - SUBJECTIVE AND OBJECTIVE BOX
Patient in bed, smiling.   RN states that she was just put down to sleep and was restless.  Patient is calm, eyes opened and states that she is not in pain.   SHe states she is good.     FUNCTIONAL PROGRESS  3/20  Bed Mobility  Bed Mobility Training Sit-to-Supine: maximum assist (25% patient effort);  1 person assist  Bed Mobility Training Supine-to-Sit: maximum assist (25% patient effort);  1 person assist  Bed Mobility Training Limitations: cognitive, decreased safety awareness;  impaired balance;  fear of falling    Sit-Stand Transfer Training  Transfer Training Sit-to-Stand Transfer: moderate assist (50% patient effort);  2 person assist;  full weight-bearing   rolling walker  Transfer Training Stand-to-Sit Transfer: moderate assist (50% patient effort);  2 person assist;  full weight-bearing   rolling walker  Sit-to-Stand Transfer Training Transfer Safety Analysis: decreased cognition;  decreased balance;  impaired balance;  cognitive, decreased safety awareness;  fear of falling;  rolling walker    Gait Training  Gait Training: moderate assist (50% patient effort);  2 person assist;  full weight-bearing   rolling walker;  50 feet  Gait Analysis: 3-point gait   decreased alfredo;  crouch;  retropulsion;  shuffling;  decreased step length;  impaired balance;  cognitive, decreased safety awareness;  fear of falling;  50 feet;  rolling walker  Brace/Orthotics Brace/Orthotics: cervical collar          REVIEW OF SYSTEMS  Constitutional - No fever,  +fatigue  HEENT - No vertigo, No neck pain  Neurological - +memory loss, No loss of strength, No numbness, No tremors  Musculoskeletal - No joint pain, No joint swelling, No muscle pain  Psychiatric - No depression, +anxiety    VITALS  T(C): 36.4 (03-21-18 @ 07:00), Max: 36.9 (03-20-18 @ 15:53)  HR: 66 (03-21-18 @ 07:00) (66 - 75)  BP: 155/76 (03-21-18 @ 07:00) (123/75 - 155/76)  RR: 18 (03-21-18 @ 07:00) (18 - 18)  SpO2: 99% (03-20-18 @ 23:19) (98% - 99%)  Wt(kg): --    MEDICATIONS   acetaminophen    Suspension. 650 milliGRAM(s) every 8 hours  ALPRAZolam 0.25 milliGRAM(s) every 6 hours  bisacodyl Suppository 10 milliGRAM(s) daily PRN  cholecalciferol 1000 Unit(s) daily  enoxaparin Injectable 30 milliGRAM(s) daily  fentaNYL   Patch  12 MICROgram(s)/Hr 1 Patch every 72 hours  ferrous    sulfate 325 milliGRAM(s) daily  gabapentin 100 milliGRAM(s) at bedtime  latanoprost 0.005% Ophthalmic Solution 1 Drop(s) at bedtime  lisinopril 10 milliGRAM(s) daily  polyethylene glycol 3350 17 Gram(s) at bedtime  risperiDONE   Tablet 0.25 milliGRAM(s) daily  sertraline 50 milliGRAM(s) daily  sertraline 25 milliGRAM(s) daily  tamsulosin 0.4 milliGRAM(s) at bedtime  traMADol 25 milliGRAM(s) every 4 hours PRN      --------------------------------------------------------------------  PHYSICAL EXAM  Constitutional - NAD, Comfortable  HEENT - Left periorbital ecchymosis  Neck - C collar  Extremities - No C/C/E, No calf tenderness   Neurologic Exam -                    Cognitive - Awake, Alert, AAO to self      Motor - Able to lift BUE and BLE - no focal deficits identified  Psychiatric - Mildly anxious, cooperative  ----------------------------------------------------------------------------------------  ASSESSMENT/PLAN  89yFemale with functional deficits after sustaining cervical fractures and SAH  Pain - Tylenol, Neurontin, Tramadol, Fentanyl  DVT PPX - Lovenox  Rehab - Recommend ACUTE inpatient rehabilitation for the functional deficits consisting of 3 hours of therapy/day & 24 hour RN/daily PMR physician for comorbid medical management. Will continue to follow for ongoing rehab needs and recommendations.

## 2018-03-21 NOTE — PROGRESS NOTE ADULT - SUBJECTIVE AND OBJECTIVE BOX
Seen Earlier this am  INTERVAL HPI/OVERNIGHT EVENTS: yelling "leave me alone, go away"  no overnight events per RN    STATUS POST:      POST OPERATIVE DAY #:     SUBJECTIVE:  Flatus: [ ] YES [ ] NO             Bowel Movement: [ ] YES [ ] NO  Pain (0-10):            Pain Control Adequate: [x ] YES [ ] NO  Nausea: [ ] YES [ ] NO            Vomiting: [ ] YES [x ] NO  Diarrhea: [ ] YES [x ] NO         Constipation: [ ] YES [ ] NO     Chest Pain: [ ] YES [ ] NO    SOB:  [ ] YES [ ] NO    MEDICATIONS  (STANDING):  acetaminophen    Suspension. 650 milliGRAM(s) Oral every 8 hours  ALPRAZolam 0.25 milliGRAM(s) Oral every 6 hours  cholecalciferol 1000 Unit(s) Oral daily  enoxaparin Injectable 30 milliGRAM(s) SubCutaneous daily  fentaNYL   Patch  12 MICROgram(s)/Hr 1 Patch Transdermal every 72 hours  ferrous    sulfate 325 milliGRAM(s) Oral daily  gabapentin 100 milliGRAM(s) Oral at bedtime  latanoprost 0.005% Ophthalmic Solution 1 Drop(s) Both EYES at bedtime  lisinopril 10 milliGRAM(s) Oral daily  polyethylene glycol 3350 17 Gram(s) Oral at bedtime  risperiDONE   Tablet 0.25 milliGRAM(s) Oral daily  sertraline 50 milliGRAM(s) Oral daily  sertraline 25 milliGRAM(s) Oral daily  tamsulosin 0.4 milliGRAM(s) Oral at bedtime    MEDICATIONS  (PRN):  bisacodyl Suppository 10 milliGRAM(s) Rectal daily PRN Constipation  traMADol 25 milliGRAM(s) Oral every 4 hours PRN Severe Pain (7 - 10)      Vital Signs Last 24 Hrs  T(C): 36.4 (21 Mar 2018 07:00), Max: 36.9 (20 Mar 2018 15:53)  T(F): 97.5 (21 Mar 2018 07:00), Max: 98.5 (20 Mar 2018 15:53)  HR: 66 (21 Mar 2018 07:00) (66 - 75)  BP: 155/76 (21 Mar 2018 07:00) (123/75 - 155/76)  BP(mean): --  RR: 18 (21 Mar 2018 07:00) (18 - 18)  SpO2: 99% (20 Mar 2018 23:19) (98% - 99%)    PHYSICAL EXAM:      Constitutional: NAD    Respiratory: CTAB    Cardiovascular: S1S2    Gastrointestinal: soft, NT/ND    Extremities: no edema          I&O's Detail    20 Mar 2018 07:01  -  21 Mar 2018 07:00  --------------------------------------------------------  IN:    Oral Fluid: 100 mL  Total IN: 100 mL    OUT:    Indwelling Catheter - Urethral: 900 mL  Total OUT: 900 mL    Total NET: -800 mL      21 Mar 2018 07:01  -  21 Mar 2018 12:37  --------------------------------------------------------  IN:    Oral Fluid: 120 mL  Total IN: 120 mL    OUT:    Indwelling Catheter - Urethral: 150 mL  Total OUT: 150 mL    Total NET: -30 mL          LABS:                RADIOLOGY & ADDITIONAL STUDIES: Seen Earlier this am  INTERVAL HPI/OVERNIGHT EVENTS: confused, pulling at collar  no overnight events per RN    STATUS POST:      POST OPERATIVE DAY #:     SUBJECTIVE:  Flatus: [ ] YES [ ] NO             Bowel Movement: [ ] YES [ ] NO  Pain (0-10):            Pain Control Adequate: [x ] YES [ ] NO  Nausea: [ ] YES [ ] NO            Vomiting: [ ] YES [x ] NO  Diarrhea: [ ] YES [x ] NO         Constipation: [ ] YES [ ] NO     Chest Pain: [ ] YES [ ] NO    SOB:  [ ] YES [ ] NO    MEDICATIONS  (STANDING):  acetaminophen    Suspension. 650 milliGRAM(s) Oral every 8 hours  ALPRAZolam 0.25 milliGRAM(s) Oral every 6 hours  cholecalciferol 1000 Unit(s) Oral daily  enoxaparin Injectable 30 milliGRAM(s) SubCutaneous daily  fentaNYL   Patch  12 MICROgram(s)/Hr 1 Patch Transdermal every 72 hours  ferrous    sulfate 325 milliGRAM(s) Oral daily  gabapentin 100 milliGRAM(s) Oral at bedtime  latanoprost 0.005% Ophthalmic Solution 1 Drop(s) Both EYES at bedtime  lisinopril 10 milliGRAM(s) Oral daily  polyethylene glycol 3350 17 Gram(s) Oral at bedtime  risperiDONE   Tablet 0.25 milliGRAM(s) Oral daily  sertraline 50 milliGRAM(s) Oral daily  sertraline 25 milliGRAM(s) Oral daily  tamsulosin 0.4 milliGRAM(s) Oral at bedtime    MEDICATIONS  (PRN):  bisacodyl Suppository 10 milliGRAM(s) Rectal daily PRN Constipation  traMADol 25 milliGRAM(s) Oral every 4 hours PRN Severe Pain (7 - 10)      Vital Signs Last 24 Hrs  T(C): 36.4 (21 Mar 2018 07:00), Max: 36.9 (20 Mar 2018 15:53)  T(F): 97.5 (21 Mar 2018 07:00), Max: 98.5 (20 Mar 2018 15:53)  HR: 66 (21 Mar 2018 07:00) (66 - 75)  BP: 155/76 (21 Mar 2018 07:00) (123/75 - 155/76)  BP(mean): --  RR: 18 (21 Mar 2018 07:00) (18 - 18)  SpO2: 99% (20 Mar 2018 23:19) (98% - 99%)    PHYSICAL EXAM:      Constitutional: NAD    Respiratory: CTAB    Cardiovascular: S1S2    Gastrointestinal: soft, NT/ND    Extremities: no edema          I&O's Detail    20 Mar 2018 07:01  -  21 Mar 2018 07:00  --------------------------------------------------------  IN:    Oral Fluid: 100 mL  Total IN: 100 mL    OUT:    Indwelling Catheter - Urethral: 900 mL  Total OUT: 900 mL    Total NET: -800 mL      21 Mar 2018 07:01  -  21 Mar 2018 12:37  --------------------------------------------------------  IN:    Oral Fluid: 120 mL  Total IN: 120 mL    OUT:    Indwelling Catheter - Urethral: 150 mL  Total OUT: 150 mL    Total NET: -30 mL          LABS:                RADIOLOGY & ADDITIONAL STUDIES:

## 2018-03-21 NOTE — PROGRESS NOTE ADULT - ASSESSMENT
89 y/o F PMHx Alzheimer's dementia, HLD, HTN, Anemia, ANxiety admit s/p fall found with small L front SAH, type 2 Dens Fx and b/l lamina fx of C1

## 2018-03-22 VITALS
RESPIRATION RATE: 18 BRPM | SYSTOLIC BLOOD PRESSURE: 113 MMHG | HEART RATE: 93 BPM | DIASTOLIC BLOOD PRESSURE: 79 MMHG | TEMPERATURE: 97 F

## 2018-03-22 PROCEDURE — 12013 RPR F/E/E/N/L/M 2.6-5.0 CM: CPT

## 2018-03-22 PROCEDURE — 72125 CT NECK SPINE W/O DYE: CPT

## 2018-03-22 PROCEDURE — 70450 CT HEAD/BRAIN W/O DYE: CPT

## 2018-03-22 PROCEDURE — 97530 THERAPEUTIC ACTIVITIES: CPT

## 2018-03-22 PROCEDURE — 97163 PT EVAL HIGH COMPLEX 45 MIN: CPT

## 2018-03-22 PROCEDURE — 97167 OT EVAL HIGH COMPLEX 60 MIN: CPT

## 2018-03-22 PROCEDURE — 92610 EVALUATE SWALLOWING FUNCTION: CPT

## 2018-03-22 PROCEDURE — 85027 COMPLETE CBC AUTOMATED: CPT

## 2018-03-22 PROCEDURE — 99232 SBSQ HOSP IP/OBS MODERATE 35: CPT

## 2018-03-22 PROCEDURE — 99285 EMERGENCY DEPT VISIT HI MDM: CPT | Mod: 25

## 2018-03-22 PROCEDURE — 93005 ELECTROCARDIOGRAM TRACING: CPT

## 2018-03-22 PROCEDURE — 84100 ASSAY OF PHOSPHORUS: CPT

## 2018-03-22 PROCEDURE — 80048 BASIC METABOLIC PNL TOTAL CA: CPT

## 2018-03-22 PROCEDURE — 85730 THROMBOPLASTIN TIME PARTIAL: CPT

## 2018-03-22 PROCEDURE — T1013: CPT

## 2018-03-22 PROCEDURE — 85610 PROTHROMBIN TIME: CPT

## 2018-03-22 PROCEDURE — 92526 ORAL FUNCTION THERAPY: CPT

## 2018-03-22 PROCEDURE — 97116 GAIT TRAINING THERAPY: CPT

## 2018-03-22 PROCEDURE — 83735 ASSAY OF MAGNESIUM: CPT

## 2018-03-22 PROCEDURE — 70480 CT ORBIT/EAR/FOSSA W/O DYE: CPT

## 2018-03-22 PROCEDURE — G0515: CPT

## 2018-03-22 PROCEDURE — 36415 COLL VENOUS BLD VENIPUNCTURE: CPT

## 2018-03-22 PROCEDURE — 71045 X-RAY EXAM CHEST 1 VIEW: CPT

## 2018-03-22 PROCEDURE — 97535 SELF CARE MNGMENT TRAINING: CPT

## 2018-03-22 RX ADMIN — ENOXAPARIN SODIUM 30 MILLIGRAM(S): 100 INJECTION SUBCUTANEOUS at 12:25

## 2018-03-22 RX ADMIN — Medication 325 MILLIGRAM(S): at 12:24

## 2018-03-22 RX ADMIN — SERTRALINE 50 MILLIGRAM(S): 25 TABLET, FILM COATED ORAL at 12:24

## 2018-03-22 RX ADMIN — Medication 0.25 MILLIGRAM(S): at 06:15

## 2018-03-22 RX ADMIN — Medication 650 MILLIGRAM(S): at 14:27

## 2018-03-22 RX ADMIN — Medication 0.25 MILLIGRAM(S): at 12:24

## 2018-03-22 RX ADMIN — Medication 650 MILLIGRAM(S): at 06:55

## 2018-03-22 RX ADMIN — LISINOPRIL 10 MILLIGRAM(S): 2.5 TABLET ORAL at 06:15

## 2018-03-22 RX ADMIN — Medication 650 MILLIGRAM(S): at 16:59

## 2018-03-22 RX ADMIN — SERTRALINE 25 MILLIGRAM(S): 25 TABLET, FILM COATED ORAL at 12:24

## 2018-03-22 RX ADMIN — FENTANYL CITRATE 1 PATCH: 50 INJECTION INTRAVENOUS at 17:58

## 2018-03-22 RX ADMIN — RISPERIDONE 0.25 MILLIGRAM(S): 4 TABLET ORAL at 12:24

## 2018-03-22 RX ADMIN — Medication 1000 UNIT(S): at 14:27

## 2018-03-22 RX ADMIN — Medication 650 MILLIGRAM(S): at 06:15

## 2018-03-22 RX ADMIN — FENTANYL CITRATE 1 PATCH: 50 INJECTION INTRAVENOUS at 18:02

## 2018-03-22 RX ADMIN — Medication 0.25 MILLIGRAM(S): at 17:58

## 2018-03-22 NOTE — PROGRESS NOTE ADULT - SUBJECTIVE AND OBJECTIVE BOX
INTERVAL HPI/OVERNIGHT EVENTS:  Patient was seen and examined at bedside this AM. No acute events overnight.     STATUS POST:      POST OPERATIVE DAY #:       MEDICATIONS  (STANDING):  acetaminophen    Suspension. 650 milliGRAM(s) Oral every 8 hours  ALPRAZolam 0.25 milliGRAM(s) Oral every 6 hours  cholecalciferol 1000 Unit(s) Oral daily  enoxaparin Injectable 30 milliGRAM(s) SubCutaneous daily  fentaNYL   Patch  12 MICROgram(s)/Hr 1 Patch Transdermal every 72 hours  ferrous    sulfate 325 milliGRAM(s) Oral daily  gabapentin 100 milliGRAM(s) Oral at bedtime  latanoprost 0.005% Ophthalmic Solution 1 Drop(s) Both EYES at bedtime  lisinopril 10 milliGRAM(s) Oral daily  polyethylene glycol 3350 17 Gram(s) Oral at bedtime  risperiDONE   Tablet 0.25 milliGRAM(s) Oral daily  sertraline 50 milliGRAM(s) Oral daily  sertraline 25 milliGRAM(s) Oral daily  tamsulosin 0.4 milliGRAM(s) Oral at bedtime    MEDICATIONS  (PRN):  bisacodyl Suppository 10 milliGRAM(s) Rectal daily PRN Constipation  traMADol 25 milliGRAM(s) Oral every 4 hours PRN Severe Pain (7 - 10)      Vital Signs Last 24 Hrs  T(C): 36.7 (22 Mar 2018 07:26), Max: 36.7 (22 Mar 2018 00:03)  T(F): 98.1 (22 Mar 2018 07:26), Max: 98.1 (22 Mar 2018 00:03)  HR: 78 (22 Mar 2018 07:26) (73 - 78)  BP: 120/74 (22 Mar 2018 07:26) (120/74 - 123/72)  BP(mean): --  RR: 19 (22 Mar 2018 07:26) (18 - 19)  SpO2: 93% (22 Mar 2018 00:03) (92% - 93%)    Physical Exam:  Gen: Pleasantly Confused  HEENT: [place] J collar in place  Respiratory: Breath Sounds equal & CTA bilaterally, no accessory muscle use  Cardiovascular: Regular rate & rhythm, normal S1, S2; no murmurs, gallops or rubs  Gastrointestinal: Soft, non-tender, nondistended  Vascular: Equal and normal pulses: 2+ peripheral pulses throughout  Musculoskeletal: No joint pain, swelling or deformity; no limitation of movement  Skin: No rashes      I&O's Detail    21 Mar 2018 07:01  -  22 Mar 2018 07:00  --------------------------------------------------------  IN:    Oral Fluid: 120 mL  Total IN: 120 mL    OUT:    Indwelling Catheter - Urethral: 300 mL  Total OUT: 300 mL    Total NET: -180 mL          LABS:                RADIOLOGY & ADDITIONAL STUDIES:

## 2018-03-22 NOTE — PROGRESS NOTE ADULT - ASSESSMENT
89 y/o F PMH Alzheimer's dementia, HTN, s/p fall found with L frontal SAH and type 2 odontoid fracture  -hemodynamically stable  -Neuo[facility]x reccs holding aspirin x1 week.     Plan:  -C collar at all times. Pt is to follow-up with Neuro[facility] 2 weeks after discharge  -Palliative care was consulted: Family requests [facility] placement. 	  -Shi catheter in place for urinary retention  -continue with home medications  -dispo planning

## 2018-03-22 NOTE — PROGRESS NOTE ADULT - ATTENDING COMMENTS
The patient was seen and examined  Much more interactive today  Appreciate palliative care consult  Discharge to SNF
The patient was seen and examined  I believe the patient is having hypoactive delirium  No narcotics  Palliative care consult  Swallow evaluation is complete  Assist with meals  DVT prophylaxis
The patient was seen and examined  No new problems  Discharge planning  Will try to remove her anne catheter today  DVT prophylaxis
The patient was seen and examined  No new problems  Discharge planning for [facility]/hospice

## 2018-03-22 NOTE — PROGRESS NOTE ADULT - SUBJECTIVE AND OBJECTIVE BOX
Patient is in bed.   Complaining of no pain.  Does not want her food, states that she is OK. Perseverates on saying her first name.     FUNCTIONAL PROGRESS  3/21  Bed Mobility  Bed Mobility Training Sit-to-Supine: moderate assist (50% patient effort);  1 person assist  Bed Mobility Training Supine-to-Sit: maximum assist (25% patient effort);  1 person assist  Bed Mobility Training Limitations: cognitive, decreased safety awareness;  decreased ability to use arms for pushing/pulling;  decreased ability to use legs for bridging/pushing;  decreased strength;  impaired balance    Sit-Stand Transfer Training  Transfer Training Sit-to-Stand Transfer: moderate assist (50% patient effort);  2 person assist;  full weight-bearing   rolling walker  Transfer Training Stand-to-Sit Transfer: moderate assist (50% patient effort);  2 person assist;  full weight-bearing   rolling walker  Sit-to-Stand Transfer Training Transfer Safety Analysis: decreased balance;  decreased cognition;  decreased strength;  impaired balance;  cognitive, decreased safety awareness;  rolling walker    Gait Training  Gait Training: moderate assist (50% patient effort);  maximum assist (25% patient effort);  2 person assist;  full weight-bearing   rolling walker;  50 feet  Gait Analysis: 3-point gait   decreased alfredo;  ataxic;  retropulsion;  shuffling;  decreased step length;  decreased strength;  impaired balance;  cognitive, decreased safety awareness;  50 feet;  rolling walker      REVIEW OF SYSTEMS  Constitutional - No fever,  +fatigue  Neurological - +memory loss    VITALS  T(C): 36.7 (03-22-18 @ 07:26), Max: 36.7 (03-22-18 @ 00:03)  HR: 78 (03-22-18 @ 07:26) (73 - 78)  BP: 120/74 (03-22-18 @ 07:26) (120/74 - 123/72)  RR: 19 (03-22-18 @ 07:26) (18 - 19)  SpO2: 93% (03-22-18 @ 00:03) (92% - 93%)  Wt(kg): --    MEDICATIONS   acetaminophen    Suspension. 650 milliGRAM(s) every 8 hours  ALPRAZolam 0.25 milliGRAM(s) every 6 hours  bisacodyl Suppository 10 milliGRAM(s) daily PRN  cholecalciferol 1000 Unit(s) daily  enoxaparin Injectable 30 milliGRAM(s) daily  fentaNYL   Patch  12 MICROgram(s)/Hr 1 Patch every 72 hours  ferrous    sulfate 325 milliGRAM(s) daily  gabapentin 100 milliGRAM(s) at bedtime  latanoprost 0.005% Ophthalmic Solution 1 Drop(s) at bedtime  lisinopril 10 milliGRAM(s) daily  polyethylene glycol 3350 17 Gram(s) at bedtime  risperiDONE   Tablet 0.25 milliGRAM(s) daily  sertraline 50 milliGRAM(s) daily  sertraline 25 milliGRAM(s) daily  tamsulosin 0.4 milliGRAM(s) at bedtime  traMADol 25 milliGRAM(s) every 4 hours PRN      --------------------------------------------------------------------  PHYSICAL EXAM  Constitutional - NAD, Comfortable  HEENT - Left periorbital ecchymosis  Neck - C collar  Extremities - No C/C/E, No calf tenderness   Neurologic Exam -                    Cognitive - Awake, Alert, AAO to self      Motor - Able to lift BUE  - equal 3/5  Psychiatric - Cooperative  ----------------------------------------------------------------------------------------  ASSESSMENT/PLAN  89yFemale with functional deficits after sustaining cervical fractures and SAH  Pain - Tylenol, Neurontin, Tramadol, Fentanyl  DVT PPX - Lovenox  Rehab - Recommend ACUTE inpatient rehabilitation for the functional deficits consisting of 3 hours of therapy/day & 24 hour RN/daily PMR physician for comorbid medical management. Will continue to follow for ongoing rehab needs and recommendations.

## 2019-06-25 NOTE — ED PROVIDER NOTE - SKIN TURGOR
PDMP reviewed; therapy appropriate, no aberrant behavior identified, prescription given.   
Refill request: ALPRAZolam (XANAX) 0.25 MG tablet  Last filled: 05/24/19    Last office visit: 02/08//19  Future apt: 07/09/19    Please review and address refill.  
resilient/elastic

## 2020-12-31 PROBLEM — G30.9 ALZHEIMER'S DEMENTIA: Status: ACTIVE | Noted: 2017-07-13

## 2021-02-03 NOTE — PROGRESS NOTE ADULT - PROVIDER SPECIALTY LIST ADULT
Rehab Medicine Clofazimine Counseling:  I discussed with the patient the risks of clofazimine including but not limited to skin and eye pigmentation, liver damage, nausea/vomiting, gastrointestinal bleeding and allergy.

## 2021-06-10 NOTE — PATIENT PROFILE ADULT. - ABILITY TO HEAR (WITH HEARING AID OR HEARING APPLIANCE IF NORMALLY USED):
51y old  Female who presents with a chief complaint of Valacyclovir resistant HSV (10 Marcelino 2021 12:02)      Interval history:  Afebrile, feeling better. offers no complains.       Allergies:   No Known Allergies      Antimicrobials:  bictegravir 50 mG/emtricitabine 200 mG/tenofovir alafenamide 25 mG (BIKTARVY) 1 Tablet(s) Oral daily  foscarnet (Peripheral) IVPB 2100 milliGRAM(s) IV Intermittent every 12 hours      REVIEW OF SYSTEMS:  No chest pain   No SOB  No N/V  No new rash.       Vital Signs Last 24 Hrs  T(C): 36.7 (06-10-21 @ 17:34), Max: 36.8 (06-10-21 @ 09:05)  T(F): 98.1 (06-10-21 @ 17:34), Max: 98.2 (06-10-21 @ 09:05)  HR: 76 (06-10-21 @ 17:34) (74 - 85)  BP: 138/72 (06-10-21 @ 17:34) (100/66 - 146/84)  BP(mean): --  RR: 18 (06-10-21 @ 17:34) (18 - 18)  SpO2: 98% (06-10-21 @ 17:34) (96% - 99%)      PHYSICAL EXAM:  Patient in no acute distress. AAOX3.  No icterus, no oral ulcers.  Cardiovascular: S1S2 normal.  Lungs: + air entry B/L lung fields.  Gastrointestinal: soft, nontender, nondistended.  Extremities: no edema.  IV sites not inflamed.                             10.9   3.32  )-----------( 267      ( 10 Marcelino 2021 06:57 )             33.8   06-10    137  |  104  |  19  ----------------------------<  127<H>  3.8   |  20<L>  |  1.50<H>    Ca    10.3      10 Marcelino 2021 06:57  Mg     1.8     06-10    TPro  8.4<H>  /  Alb  3.6  /  TBili  0.4  /  DBili  x   /  AST  14  /  ALT  17  /  AlkPhos  273<H>  06-10      LIVER FUNCTIONS - ( 10 Marcelino 2021 06:57 )  Alb: 3.6 g/dL / Pro: 8.4 g/dL / ALK PHOS: 273 U/L / ALT: 17 U/L / AST: 14 U/L / GGT: x                    Adequate: hears normal conversation without difficulty

## 2022-07-15 NOTE — CONSULT NOTE ADULT - SUBJECTIVE AND OBJECTIVE BOX
Head,  normocephalic,  atraumatic,  Face,  Face within normal limits,  Ears,  External ears within normal limits,  Nose/Nasopharynx,  External nose  normal appearance,  nares patent,  no nasal discharge,  Mouth and Throat,  Oral cavity appearance normal,  Breath odor normal,  Lips,  Appearance normal note is INCOMPLETE    --please admit to trauma. Repeat Head CT in 6 hours. q1 neuro checks. HOLD anticoagulation. Wear C-collar at all times.   Get MRI of the C-spine w/ and w/out contrast unless there are contraindications. HPI:  Source: Dr. Escalona and I called patient's daughter by phone (Name: Yaniv/Cell: [phone])   was present for evaluation.     89F w/ PMH HTN and Dementia presents to ED s/p fall at nursing home. Patient tripped over her walker and hit the L side of her face. Denies any LOC.  She is not taking any anticoagulation therapy.   Patient is seen awake and alert, oriented to self, but not oriented to location/place/date/time.  She is seen lying on exam bed with Cervical Collar on. She is moving all extremities and able to follow simple commands (i.e. hand , wiggle toes, show 2 fingers).  Patient is answering questions in [place]. Due to her dementia, I was unable to obtain a detailed history from the patient about her medical history and what happened. Per Dr. Escalona, she was answering questions earlier in English and then stopped speaking English and switched to talking in Salv[place]orean Only. Per her daughter, this is the patient's baseline.     PAST MEDICAL & SURGICAL HISTORY:  Per daughter, patient has HTN and Dementia.    REVIEW OF SYSTEMS: (Unable to obtain a ROS from the patient).    Allergies:  No Known Allergies    MEDICATIONS  (STANDING): HTN meds (daughter does not recall name of medication)    SOCIAL HISTORY: Lives in a nursing home. No ETOH/Cigarette smoking.     Vital Signs Last 24 Hrs  T(C): 36.7 (16 Mar 2018 19:31), Max: 36.7 (16 Mar 2018 19:31)  T(F): 98.1 (16 Mar 2018 19:31), Max: 98.1 (16 Mar 2018 19:31)  HR: 85 (16 Mar 2018 19:31) (85 - 85)  BP: 179/73 (16 Mar 2018 19:31) (179/73 - 179/73)  BP(mean): --  RR: 20 (16 Mar 2018 19:31) (20 - 20)  SpO2: 96% (16 Mar 2018 19:31) (96% - 96%)    PHYSICAL EXAM:  GENERAL: Awake and alert. Oriented to self. Wearing a Cervical collar.   HEAD: Normocephalic, L Periorbital swelling noted, Small laceration to L eyebrow region, No bleeding and drainage noted.   EYES: EOMI, PERRLA, conjunctiva and sclera clear, Visual fields are grossly intact.   ENMT: No tonsillar erythema, exudates, or enlargement; Moist mucous membranes, Good dentition, No lesions  NECK: Supple, No JVD, Normal thyroid, Cervical collar intact.   NERVOUS SYSTEM:  Alert & Oriented to self, Not oriented to place/location/time, Able to follow simple commands (i.e. show 2 fingers, hand , wiggle toes) in all extremities, Moves all extremities, Motor Strength 5/5 B/L upper and lower extremities; DTRs 2+ intact and symmetric, Sensation intact to soft touch in all extremities.   CHEST/LUNG: Clear to percussion bilaterally; No rales, rhonchi, wheezing, or rubs  HEART: Regular rate and rhythm; No murmurs, rubs, or gallops  ABDOMEN: Soft, Nontender, Nondistended; Bowel sounds present  EXTREMITIES:  2+ Peripheral Pulses, No clubbing, cyanosis, or edema  LYMPH: No lymphadenopathy noted  SKIN: No rashes, small laceration over L eye brow region.       RADIOLOGY & ADDITIONAL STUDIES:  ~~~~~~~~~~~~~~~~~~~~~~~~~~~~  < from: CT Head No Cont (03.16.18 @ 22:08) >  IMPRESSION: Trace subarachnoid hemorrhage, posttraumatic. Follow-up recommended    < from: CT Cervical Spine No Cont (03.16.18 @ 22:08) >  Acute fracture of the base of the odontoid process with 5 mm posterior   subluxation (type II). No narrowing of the canal.  Fractures through bilateral lamina of C1.  Discussed with Dr. Escalona at 10:49 PM HPI:  Source: Dr. Escalona and I called patient's daughter by phone (Name: Yaniv/Cell: [phone])   was present for evaluation.     89F w/ PMH HTN and Dementia presents to ED s/p fall at nursing home. Patient tripped over her walker and hit the L side of her face. Denies any LOC.  Per daughter, patient takes ASA 81mg QD.   Patient is seen awake and alert, oriented to self, but not oriented to location/place/date/time.  She is seen lying on exam bed with Cervical Collar on. She is moving all extremities and able to follow simple commands (i.e. hand , wiggle toes, show 2 fingers).  Patient is answering questions in [place]. Due to her dementia, I was unable to obtain a detailed history from the patient about her medical history and what happened. Per Dr. Escalona, she was answering questions earlier in English and then stopped speaking English and switched to talking in [place] Only. Per her daughter, this is the patient's baseline.     PAST MEDICAL & SURGICAL HISTORY:  Per daughter, patient has HTN and Dementia.    REVIEW OF SYSTEMS: (Unable to obtain a ROS from the patient).    Allergies:  No Known Allergies    MEDICATIONS  (STANDING): HTN meds (daughter does not recall name of medication)    SOCIAL HISTORY: Lives in a nursing home. No ETOH/Cigarette smoking.     Vital Signs Last 24 Hrs  T(C): 36.7 (16 Mar 2018 19:31), Max: 36.7 (16 Mar 2018 19:31)  T(F): 98.1 (16 Mar 2018 19:31), Max: 98.1 (16 Mar 2018 19:31)  HR: 85 (16 Mar 2018 19:31) (85 - 85)  BP: 179/73 (16 Mar 2018 19:31) (179/73 - 179/73)  BP(mean): --  RR: 20 (16 Mar 2018 19:31) (20 - 20)  SpO2: 96% (16 Mar 2018 19:31) (96% - 96%)    PHYSICAL EXAM:  GENERAL: Awake and alert. Oriented to self. Wearing a Cervical collar.   HEAD: Normocephalic, L Periorbital swelling noted, Small laceration to L eyebrow region, No bleeding and drainage noted.   EYES: EOMI, PERRLA, conjunctiva and sclera clear, Visual fields are grossly intact.   ENMT: No tonsillar erythema, exudates, or enlargement; Moist mucous membranes, Good dentition, No lesions  NECK: Supple, No JVD, Normal thyroid, Cervical collar intact.   NERVOUS SYSTEM:  Alert & Oriented to self, Not oriented to place/location/time, Able to follow simple commands (i.e. show 2 fingers, hand , wiggle toes) in all extremities, Moves all extremities, Motor Strength 5/5 B/L upper and lower extremities; DTRs 2+ intact and symmetric, Sensation intact to soft touch in all extremities.   CHEST/LUNG: Clear to percussion bilaterally; No rales, rhonchi, wheezing, or rubs  HEART: Regular rate and rhythm; No murmurs, rubs, or gallops  ABDOMEN: Soft, Nontender, Nondistended; Bowel sounds present  EXTREMITIES:  2+ Peripheral Pulses, No clubbing, cyanosis, or edema  LYMPH: No lymphadenopathy noted  SKIN: No rashes, small laceration over L eye brow region.       RADIOLOGY & ADDITIONAL STUDIES:  ~~~~~~~~~~~~~~~~~~~~~~~~~~~~  < from: CT Head No Cont (03.16.18 @ 22:08) >  IMPRESSION: Trace subarachnoid hemorrhage, posttraumatic. Follow-up recommended    < from: CT Cervical Spine No Cont (03.16.18 @ 22:08) >  Acute fracture of the base of the odontoid process with 5 mm posterior   subluxation (type II). No narrowing of the canal.  Fractures through bilateral lamina of C1.  Discussed with Dr. Escalona at 10:49 PM HPI:  Source: Dr. Escalona and I called patient's daughter by phone (Name: Yaniv/Cell: [phone])   was present for evaluation.     89F w/ PMH HTN and Dementia presents to ED s/p fall at nursing home. Patient tripped over her walker and hit the L side of her face. Denies any LOC.  Per daughter, patient takes ASA 81mg QD.   Patient is seen awake and alert, oriented to self, but not oriented to location/place/date/time.  She is seen lying on exam bed with Cervical Collar on. She is moving all extremities and able to follow simple commands (i.e. hand , wiggle toes, show 2 fingers).  Patient is answering questions in Scottish. Due to her dementia, I was unable to obtain a detailed history from the patient about her medical history and what happened. Per Dr. Escalona, she was answering questions earlier in English and then stopped speaking English and switched to talking in Scottish Only. Per her daughter, this is the patient's baseline.     PAST MEDICAL & SURGICAL HISTORY:  Per daughter, patient has HTN and Dementia.    REVIEW OF SYSTEMS: (Unable to obtain a ROS from the patient).    Allergies:  No Known Allergies    MEDICATIONS  (STANDING): HTN meds (daughter does not recall name of medication), ASA 81mg, Xanax    SOCIAL HISTORY: Lives in a nursing home. No ETOH/Cigarette smoking.     Vital Signs Last 24 Hrs  T(C): 36.7 (16 Mar 2018 19:31), Max: 36.7 (16 Mar 2018 19:31)  T(F): 98.1 (16 Mar 2018 19:31), Max: 98.1 (16 Mar 2018 19:31)  HR: 85 (16 Mar 2018 19:31) (85 - 85)  BP: 179/73 (16 Mar 2018 19:31) (179/73 - 179/73)  BP(mean): --  RR: 20 (16 Mar 2018 19:31) (20 - 20)  SpO2: 96% (16 Mar 2018 19:31) (96% - 96%)    PHYSICAL EXAM:  GENERAL: Awake and alert. Oriented to self. Wearing a Cervical collar.   HEAD: Normocephalic, L Periorbital swelling noted, Small laceration to L eyebrow region, No bleeding and drainage noted.   EYES: EOMI, PERRLA, conjunctiva and sclera clear, Visual fields are grossly intact.   ENMT: No tonsillar erythema, exudates, or enlargement; Moist mucous membranes, Good dentition, No lesions  NECK: Supple, No JVD, Normal thyroid, Cervical collar intact.   NERVOUS SYSTEM:  Alert & Oriented to self, Not oriented to place/location/time, Able to follow simple commands (i.e. show 2 fingers, hand , wiggle toes) in all extremities, Moves all extremities, Motor Strength 5/5 B/L upper and lower extremities; DTRs 2+ intact and symmetric, Sensation intact to soft touch in all extremities.   CHEST/LUNG: Clear to percussion bilaterally; No rales, rhonchi, wheezing, or rubs  HEART: Regular rate and rhythm; No murmurs, rubs, or gallops  ABDOMEN: Soft, Nontender, Nondistended; Bowel sounds present  EXTREMITIES:  2+ Peripheral Pulses, No clubbing, cyanosis, or edema  LYMPH: No lymphadenopathy noted  SKIN: No rashes, small laceration over L eye brow region.       RADIOLOGY & ADDITIONAL STUDIES:  ~~~~~~~~~~~~~~~~~~~~~~~~~~~~  < from: CT Head No Cont (03.16.18 @ 22:08) >  IMPRESSION: Trace subarachnoid hemorrhage, posttraumatic. Follow-up recommended    < from: CT Cervical Spine No Cont (03.16.18 @ 22:08) >  Acute fracture of the base of the odontoid process with 5 mm posterior   subluxation (type II). No narrowing of the canal.  Fractures through bilateral lamina of C1.  Discussed with Dr. Escalona at 10:49 PM
